# Patient Record
Sex: MALE | Race: BLACK OR AFRICAN AMERICAN | Employment: STUDENT | URBAN - METROPOLITAN AREA
[De-identification: names, ages, dates, MRNs, and addresses within clinical notes are randomized per-mention and may not be internally consistent; named-entity substitution may affect disease eponyms.]

---

## 2022-05-17 NOTE — H&P (VIEW-ONLY)
Name: Rigoberto Owen  YOB: 2000  Gender: male  MRN: [id number]      CC: Pre-op Exam (left hip)       HPI: Rigoberto Owen is a 25 y.o. male who returns for follow up on his left hip. He is here today for a preop exam prior to his scheduled procedure on 05/18/2022. He had an injury in April where he slipped while playing basketball and injured the left hip. He has failed conservative management and would like to proceed with surgery. He did have a second opinion with Dr. Gisela Wiley who reviewed the pathology with him as well and was in agreement with our assessment and plan. Current Outpatient Medications:     indomethacin SR (INDOCIN SR) 75 mg SR capsule, Take 1 Capsule by mouth daily for 3 days. (Patient not taking: Reported on 5/17/2022), Disp: 3 Capsule, Rfl: 0    meloxicam (MOBIC) 7.5 mg tablet, Take 1 Tablet by mouth daily. (Patient not taking: Reported on 5/17/2022), Disp: 30 Tablet, Rfl: 1    ondansetron (ZOFRAN ODT) 4 mg disintegrating tablet, Take 1 Tablet by mouth every six (6) hours as needed for Nausea or Vomiting. (Patient not taking: Reported on 5/17/2022), Disp: 20 Tablet, Rfl: 0    oxyCODONE IR (ROXICODONE) 5 mg immediate release tablet, Take 1-2 Tablets by mouth every four (4) hours as needed for Pain for up to 14 days. Max Daily Amount: 60 mg. (Patient not taking: Reported on 5/17/2022), Disp: 25 Tablet, Rfl: 0  No Known Allergies  No past medical history on file. No past surgical history on file. No family history on file.   Social History     Socioeconomic History    Marital status: SINGLE     Spouse name: Not on file    Number of children: Not on file    Years of education: Not on file    Highest education level: Not on file   Occupational History    Not on file   Tobacco Use    Smoking status: Never Smoker    Smokeless tobacco: Never Used   Vaping Use    Vaping Use: Never used   Substance and Sexual Activity    Alcohol use: Not Currently    Drug use: Never    Sexual activity: Not on file   Other Topics Concern    Not on file   Social History Narrative    Not on file     Social Determinants of Health     Financial Resource Strain:     Difficulty of Paying Living Expenses: Not on file   Food Insecurity:     Worried About Running Out of Food in the Last Year: Not on file    Emily of Food in the Last Year: Not on file   Transportation Needs:     Lack of Transportation (Medical): Not on file    Lack of Transportation (Non-Medical): Not on file   Physical Activity:     Days of Exercise per Week: Not on file    Minutes of Exercise per Session: Not on file   Stress:     Feeling of Stress : Not on file   Social Connections:     Frequency of Communication with Friends and Family: Not on file    Frequency of Social Gatherings with Friends and Family: Not on file    Attends Restorationism Services: Not on file    Active Member of [address] [place] [address] [employer] or Organizations: Not on file    Attends Club or Organization Meetings: Not on file    Marital Status: Not on file   Intimate Partner Violence:     Fear of Current or Ex-Partner: Not on file    Emotionally Abused: Not on file    Physically Abused: Not on file    Sexually Abused: Not on file   Housing Stability:     Unable to Pay for Housing in the Last Year: Not on file    Number of [place] in the Last Year: Not on file    Unstable Housing in the Last Year: Not on file           Physical Examination:  General: no acute distress  HEENT: Normocephalic/atraumatic  Lungs: breathing easily  CV: regular rhythm by pulse  Abdomen: Nontender  Left Hip: Obligatory external rotation of a few degrees flexion to 100 degrees internal rotation 5 to 10 degrees with positive FADIR impingement external rotation to about 40 with mild pain with SILVANA pain with Stinchfield good resisted abduction abduction strength. Assessment:     ICD-10-CM ICD-9-CM   1. Tear of left acetabular labrum, subsequent encounter  S73.192D V58.89     843.8   2. Left hip pain  M25.552 719.45   3. Femoral acetabular impingement  M25.059 719.85        Plan:   We discussed the details risks and benefits of hip arthroscopy to include cam and rim osteoplasty and labral repair versus debridement. We reviewed the risk of surgery including but not limited to anesthetic complications, infection, postoperative DVT/PE, postoperative stiffness, lateral femoral cutaneous nerve palsy, pudendal nerve palsy, heterotopic ossification as well as continued hip pain and possible need for further surgery in the future. We also reviewed the postoperative course including extensive physical therapy and a likely 5 to 6-month full recovery period. The patient expressed understanding and elected to proceed as planned, informed consent was obtained. Surgical plan to be for left hip arthroscopy cam and rim osteoplasty labral repair    Follow up         Elías Avilez MD, [address] [place] [address] Sports Medicine

## 2022-05-18 ENCOUNTER — APPOINTMENT (OUTPATIENT)
Dept: GENERAL RADIOLOGY | Age: 22
End: 2022-05-18
Attending: ORTHOPAEDIC SURGERY
Payer: COMMERCIAL

## 2022-05-18 ENCOUNTER — ANESTHESIA (OUTPATIENT)
Dept: SURGERY | Age: 22
End: 2022-05-18
Payer: COMMERCIAL

## 2022-05-18 ENCOUNTER — HOSPITAL ENCOUNTER (OUTPATIENT)
Age: 22
Setting detail: OUTPATIENT SURGERY
Discharge: HOME | End: 2022-05-18
Attending: ORTHOPAEDIC SURGERY | Admitting: ORTHOPAEDIC SURGERY
Payer: COMMERCIAL

## 2022-05-18 ENCOUNTER — ANESTHESIA EVENT (OUTPATIENT)
Dept: SURGERY | Age: 22
End: 2022-05-18
Payer: COMMERCIAL

## 2022-05-18 VITALS
DIASTOLIC BLOOD PRESSURE: 76 MMHG | SYSTOLIC BLOOD PRESSURE: 164 MMHG | BODY MASS INDEX: 25.19 KG/M2 | RESPIRATION RATE: 16 BRPM | WEIGHT: 218 LBS | HEART RATE: 68 BPM | TEMPERATURE: 98.3 F | OXYGEN SATURATION: 99 %

## 2022-05-18 LAB — HGB BLD-MCNC: 14.4 G/DL (ref 13.6–17.2)

## 2022-05-18 PROCEDURE — 77030019908 HC STETH ESOPH SIMS -A: Performed by: ANESTHESIOLOGY

## 2022-05-18 PROCEDURE — 77030008009 HC PRB ARTHO ABLT ARTH -C: Performed by: ORTHOPAEDIC SURGERY

## 2022-05-18 PROCEDURE — 74011000250 HC RX REV CODE- 250: Performed by: NURSE ANESTHETIST, CERTIFIED REGISTERED

## 2022-05-18 PROCEDURE — 74011250636 HC RX REV CODE- 250/636: Performed by: NURSE ANESTHETIST, CERTIFIED REGISTERED

## 2022-05-18 PROCEDURE — 77030002933 HC SUT MCRYL J&J -A: Performed by: ORTHOPAEDIC SURGERY

## 2022-05-18 PROCEDURE — C1713 ANCHOR/SCREW BN/BN,TIS/BN: HCPCS | Performed by: ORTHOPAEDIC SURGERY

## 2022-05-18 PROCEDURE — 2709999900 HC NON-CHARGEABLE SUPPLY: Performed by: ORTHOPAEDIC SURGERY

## 2022-05-18 PROCEDURE — 77030008494 HC TBNG ARTHSC IRR ARTH -B: Performed by: ORTHOPAEDIC SURGERY

## 2022-05-18 PROCEDURE — 77030040922 HC BLNKT HYPOTHRM STRY -A: Performed by: ANESTHESIOLOGY

## 2022-05-18 PROCEDURE — 29916 HIP ARTHRO W/LABRAL REPAIR: CPT | Performed by: ORTHOPAEDIC SURGERY

## 2022-05-18 PROCEDURE — 76060000036 HC ANESTHESIA 2.5 TO 3 HR: Performed by: ORTHOPAEDIC SURGERY

## 2022-05-18 PROCEDURE — 74011250636 HC RX REV CODE- 250/636: Performed by: ANESTHESIOLOGY

## 2022-05-18 PROCEDURE — 29914 HIP ARTHRO W/FEMOROPLASTY: CPT | Performed by: ORTHOPAEDIC SURGERY

## 2022-05-18 PROCEDURE — 77030002922 HC SUT FBRWRE ARTH -B: Performed by: ORTHOPAEDIC SURGERY

## 2022-05-18 PROCEDURE — 76010000172 HC OR TIME 2.5 TO 3 HR INTENSV-TIER 1: Performed by: ORTHOPAEDIC SURGERY

## 2022-05-18 PROCEDURE — 77030039260 HC BLD CAPSULECT ARTH -C: Performed by: ORTHOPAEDIC SURGERY

## 2022-05-18 PROCEDURE — 85018 HEMOGLOBIN: CPT

## 2022-05-18 PROCEDURE — 77030039425 HC BLD LARYNG TRULITE DISP TELE -A: Performed by: ANESTHESIOLOGY

## 2022-05-18 PROCEDURE — 77030034881 HC SUT MNGR SLNGSHOT DISP INST STRY -D: Performed by: ORTHOPAEDIC SURGERY

## 2022-05-18 PROCEDURE — 77030032673: Performed by: ORTHOPAEDIC SURGERY

## 2022-05-18 PROCEDURE — 74011250637 HC RX REV CODE- 250/637: Performed by: ANESTHESIOLOGY

## 2022-05-18 PROCEDURE — 77030020274 HC MISC IMPL ORTHOPEDIC: Performed by: ORTHOPAEDIC SURGERY

## 2022-05-18 PROCEDURE — 77030034880: Performed by: ORTHOPAEDIC SURGERY

## 2022-05-18 PROCEDURE — 74011250636 HC RX REV CODE- 250/636: Performed by: ORTHOPAEDIC SURGERY

## 2022-05-18 PROCEDURE — 77030037088 HC TUBE ENDOTRACH ORAL NSL COVD-A: Performed by: ANESTHESIOLOGY

## 2022-05-18 PROCEDURE — 76210000020 HC REC RM PH II FIRST 0.5 HR: Performed by: ORTHOPAEDIC SURGERY

## 2022-05-18 PROCEDURE — 74011250636 HC RX REV CODE- 250/636: Performed by: SPECIALIST/TECHNOLOGIST

## 2022-05-18 PROCEDURE — 77030015993 HC INSRT FT PD S&N -B: Performed by: ORTHOPAEDIC SURGERY

## 2022-05-18 PROCEDURE — 77030018673: Performed by: ORTHOPAEDIC SURGERY

## 2022-05-18 PROCEDURE — 77030036647 HC PORTAL ENTRY ENDOSC KT DISP STRY -D: Performed by: ORTHOPAEDIC SURGERY

## 2022-05-18 PROCEDURE — 77030019605: Performed by: ORTHOPAEDIC SURGERY

## 2022-05-18 PROCEDURE — 77030042187 HC SUT PASS -C: Performed by: ORTHOPAEDIC SURGERY

## 2022-05-18 PROCEDURE — 76210000006 HC OR PH I REC 0.5 TO 1 HR: Performed by: ORTHOPAEDIC SURGERY

## 2022-05-18 RX ORDER — NEOSTIGMINE METHYLSULFATE 1 MG/ML
INJECTION, SOLUTION INTRAVENOUS AS NEEDED
Status: DISCONTINUED | OUTPATIENT
Start: 2022-05-18 | End: 2022-05-18 | Stop reason: HOSPADM

## 2022-05-18 RX ORDER — PROPOFOL 10 MG/ML
INJECTION, EMULSION INTRAVENOUS AS NEEDED
Status: DISCONTINUED | OUTPATIENT
Start: 2022-05-18 | End: 2022-05-18 | Stop reason: HOSPADM

## 2022-05-18 RX ORDER — FENTANYL CITRATE 50 UG/ML
INJECTION, SOLUTION INTRAMUSCULAR; INTRAVENOUS AS NEEDED
Status: DISCONTINUED | OUTPATIENT
Start: 2022-05-18 | End: 2022-05-18 | Stop reason: HOSPADM

## 2022-05-18 RX ORDER — KETOROLAC TROMETHAMINE 30 MG/ML
INJECTION, SOLUTION INTRAMUSCULAR; INTRAVENOUS AS NEEDED
Status: DISCONTINUED | OUTPATIENT
Start: 2022-05-18 | End: 2022-05-18 | Stop reason: HOSPADM

## 2022-05-18 RX ORDER — SODIUM CHLORIDE 0.9 % (FLUSH) 0.9 %
5-40 SYRINGE (ML) INJECTION EVERY 8 HOURS
Status: DISCONTINUED | OUTPATIENT
Start: 2022-05-18 | End: 2022-05-18 | Stop reason: HOSPADM

## 2022-05-18 RX ORDER — MIDAZOLAM HYDROCHLORIDE 1 MG/ML
INJECTION, SOLUTION INTRAMUSCULAR; INTRAVENOUS AS NEEDED
Status: DISCONTINUED | OUTPATIENT
Start: 2022-05-18 | End: 2022-05-18 | Stop reason: HOSPADM

## 2022-05-18 RX ORDER — OXYCODONE HYDROCHLORIDE 5 MG/1
5 TABLET ORAL
Status: DISCONTINUED | OUTPATIENT
Start: 2022-05-18 | End: 2022-05-18 | Stop reason: HOSPADM

## 2022-05-18 RX ORDER — SODIUM CHLORIDE 9 MG/ML
50 INJECTION, SOLUTION INTRAVENOUS CONTINUOUS
Status: DISCONTINUED | OUTPATIENT
Start: 2022-05-18 | End: 2022-05-18 | Stop reason: HOSPADM

## 2022-05-18 RX ORDER — SODIUM CHLORIDE, SODIUM LACTATE, POTASSIUM CHLORIDE, CALCIUM CHLORIDE 600; 310; 30; 20 MG/100ML; MG/100ML; MG/100ML; MG/100ML
100 INJECTION, SOLUTION INTRAVENOUS CONTINUOUS
Status: DISCONTINUED | OUTPATIENT
Start: 2022-05-18 | End: 2022-05-18 | Stop reason: HOSPADM

## 2022-05-18 RX ORDER — FENTANYL CITRATE 50 UG/ML
25 INJECTION, SOLUTION INTRAMUSCULAR; INTRAVENOUS ONCE
Status: DISCONTINUED | OUTPATIENT
Start: 2022-05-18 | End: 2022-05-18 | Stop reason: HOSPADM

## 2022-05-18 RX ORDER — GLYCOPYRROLATE 0.2 MG/ML
INJECTION INTRAMUSCULAR; INTRAVENOUS AS NEEDED
Status: DISCONTINUED | OUTPATIENT
Start: 2022-05-18 | End: 2022-05-18 | Stop reason: HOSPADM

## 2022-05-18 RX ORDER — MIDAZOLAM HYDROCHLORIDE 1 MG/ML
2 INJECTION, SOLUTION INTRAMUSCULAR; INTRAVENOUS ONCE
Status: DISCONTINUED | OUTPATIENT
Start: 2022-05-18 | End: 2022-05-18 | Stop reason: HOSPADM

## 2022-05-18 RX ORDER — TRANEXAMIC ACID 100 MG/ML
INJECTION, SOLUTION INTRAVENOUS AS NEEDED
Status: DISCONTINUED | OUTPATIENT
Start: 2022-05-18 | End: 2022-05-18 | Stop reason: HOSPADM

## 2022-05-18 RX ORDER — CEFAZOLIN SODIUM/WATER 2 G/20 ML
2 SYRINGE (ML) INTRAVENOUS ONCE
Status: COMPLETED | OUTPATIENT
Start: 2022-05-18 | End: 2022-05-18

## 2022-05-18 RX ORDER — DIPHENHYDRAMINE HYDROCHLORIDE 50 MG/ML
12.5 INJECTION, SOLUTION INTRAMUSCULAR; INTRAVENOUS
Status: DISCONTINUED | OUTPATIENT
Start: 2022-05-18 | End: 2022-05-18 | Stop reason: HOSPADM

## 2022-05-18 RX ORDER — SODIUM CHLORIDE, SODIUM LACTATE, POTASSIUM CHLORIDE, CALCIUM CHLORIDE 600; 310; 30; 20 MG/100ML; MG/100ML; MG/100ML; MG/100ML
75 INJECTION, SOLUTION INTRAVENOUS CONTINUOUS
Status: DISCONTINUED | OUTPATIENT
Start: 2022-05-18 | End: 2022-05-18 | Stop reason: HOSPADM

## 2022-05-18 RX ORDER — SODIUM CHLORIDE 0.9 % (FLUSH) 0.9 %
5-40 SYRINGE (ML) INJECTION AS NEEDED
Status: DISCONTINUED | OUTPATIENT
Start: 2022-05-18 | End: 2022-05-18 | Stop reason: HOSPADM

## 2022-05-18 RX ORDER — EPINEPHRINE 1 MG/ML
INJECTION, SOLUTION, CONCENTRATE INTRAVENOUS AS NEEDED
Status: DISCONTINUED | OUTPATIENT
Start: 2022-05-18 | End: 2022-05-18 | Stop reason: HOSPADM

## 2022-05-18 RX ORDER — LIDOCAINE HYDROCHLORIDE 10 MG/ML
0.1 INJECTION INFILTRATION; PERINEURAL AS NEEDED
Status: DISCONTINUED | OUTPATIENT
Start: 2022-05-18 | End: 2022-05-18 | Stop reason: HOSPADM

## 2022-05-18 RX ORDER — LIDOCAINE HYDROCHLORIDE 20 MG/ML
INJECTION, SOLUTION EPIDURAL; INFILTRATION; INTRACAUDAL; PERINEURAL AS NEEDED
Status: DISCONTINUED | OUTPATIENT
Start: 2022-05-18 | End: 2022-05-18 | Stop reason: HOSPADM

## 2022-05-18 RX ORDER — HYDROMORPHONE HYDROCHLORIDE 2 MG/ML
0.5 INJECTION, SOLUTION INTRAMUSCULAR; INTRAVENOUS; SUBCUTANEOUS
Status: DISCONTINUED | OUTPATIENT
Start: 2022-05-18 | End: 2022-05-18 | Stop reason: HOSPADM

## 2022-05-18 RX ORDER — ROCURONIUM BROMIDE 10 MG/ML
INJECTION, SOLUTION INTRAVENOUS AS NEEDED
Status: DISCONTINUED | OUTPATIENT
Start: 2022-05-18 | End: 2022-05-18 | Stop reason: HOSPADM

## 2022-05-18 RX ORDER — ROPIVACAINE HYDROCHLORIDE 5 MG/ML
INJECTION, SOLUTION EPIDURAL; INFILTRATION; PERINEURAL AS NEEDED
Status: DISCONTINUED | OUTPATIENT
Start: 2022-05-18 | End: 2022-05-18 | Stop reason: HOSPADM

## 2022-05-18 RX ORDER — OXYCODONE AND ACETAMINOPHEN 5; 325 MG/1; MG/1
1 TABLET ORAL AS NEEDED
Status: DISCONTINUED | OUTPATIENT
Start: 2022-05-18 | End: 2022-05-18 | Stop reason: HOSPADM

## 2022-05-18 RX ORDER — KETAMINE HYDROCHLORIDE 50 MG/ML
INJECTION, SOLUTION INTRAMUSCULAR; INTRAVENOUS AS NEEDED
Status: DISCONTINUED | OUTPATIENT
Start: 2022-05-18 | End: 2022-05-18 | Stop reason: HOSPADM

## 2022-05-18 RX ORDER — FAMOTIDINE 20 MG/1
20 TABLET, FILM COATED ORAL ONCE
Status: COMPLETED | OUTPATIENT
Start: 2022-05-18 | End: 2022-05-18

## 2022-05-18 RX ORDER — MIDAZOLAM HYDROCHLORIDE 1 MG/ML
2 INJECTION, SOLUTION INTRAMUSCULAR; INTRAVENOUS
Status: DISCONTINUED | OUTPATIENT
Start: 2022-05-18 | End: 2022-05-18 | Stop reason: HOSPADM

## 2022-05-18 RX ORDER — DEXAMETHASONE SODIUM PHOSPHATE 4 MG/ML
INJECTION, SOLUTION INTRA-ARTICULAR; INTRALESIONAL; INTRAMUSCULAR; INTRAVENOUS; SOFT TISSUE AS NEEDED
Status: DISCONTINUED | OUTPATIENT
Start: 2022-05-18 | End: 2022-05-18 | Stop reason: HOSPADM

## 2022-05-18 RX ADMIN — KETAMINE HYDROCHLORIDE 20 MG: 50 INJECTION INTRAMUSCULAR; INTRAVENOUS at 08:27

## 2022-05-18 RX ADMIN — FENTANYL CITRATE 100 MCG: 50 INJECTION INTRAMUSCULAR; INTRAVENOUS at 08:27

## 2022-05-18 RX ADMIN — DEXAMETHASONE SODIUM PHOSPHATE 4 MG: 4 INJECTION, SOLUTION INTRAMUSCULAR; INTRAVENOUS at 08:37

## 2022-05-18 RX ADMIN — OXYCODONE HYDROCHLORIDE AND ACETAMINOPHEN 1 TABLET: 5; 325 TABLET ORAL at 11:39

## 2022-05-18 RX ADMIN — PHENYLEPHRINE HYDROCHLORIDE 100 MCG: 10 INJECTION INTRAVENOUS at 09:40

## 2022-05-18 RX ADMIN — Medication 2 G: at 08:39

## 2022-05-18 RX ADMIN — KETAMINE HYDROCHLORIDE 20 MG: 50 INJECTION INTRAMUSCULAR; INTRAVENOUS at 09:24

## 2022-05-18 RX ADMIN — SODIUM CHLORIDE, SODIUM LACTATE, POTASSIUM CHLORIDE, AND CALCIUM CHLORIDE: 600; 310; 30; 20 INJECTION, SOLUTION INTRAVENOUS at 09:25

## 2022-05-18 RX ADMIN — ROCURONIUM BROMIDE 50 MG: 50 INJECTION, SOLUTION INTRAVENOUS at 08:27

## 2022-05-18 RX ADMIN — ROCURONIUM BROMIDE 20 MG: 50 INJECTION, SOLUTION INTRAVENOUS at 08:59

## 2022-05-18 RX ADMIN — PROPOFOL 50 MG: 10 INJECTION, EMULSION INTRAVENOUS at 08:29

## 2022-05-18 RX ADMIN — PROPOFOL 350 MG: 10 INJECTION, EMULSION INTRAVENOUS at 08:27

## 2022-05-18 RX ADMIN — PHENYLEPHRINE HYDROCHLORIDE 100 MCG: 10 INJECTION INTRAVENOUS at 09:00

## 2022-05-18 RX ADMIN — HYDROMORPHONE HYDROCHLORIDE 0.5 MG: 2 INJECTION, SOLUTION INTRAMUSCULAR; INTRAVENOUS; SUBCUTANEOUS at 11:17

## 2022-05-18 RX ADMIN — Medication 3 MG: at 10:40

## 2022-05-18 RX ADMIN — TRANEXAMIC ACID 1 G: 100 INJECTION, SOLUTION INTRAVENOUS at 08:55

## 2022-05-18 RX ADMIN — FAMOTIDINE 20 MG: 20 TABLET ORAL at 07:18

## 2022-05-18 RX ADMIN — PHENYLEPHRINE HYDROCHLORIDE 100 MCG: 10 INJECTION INTRAVENOUS at 08:46

## 2022-05-18 RX ADMIN — MIDAZOLAM 2 MG: 1 INJECTION INTRAMUSCULAR; INTRAVENOUS at 08:23

## 2022-05-18 RX ADMIN — GLYCOPYRROLATE 0.4 MG: 0.2 INJECTION, SOLUTION INTRAMUSCULAR; INTRAVENOUS at 10:40

## 2022-05-18 RX ADMIN — LIDOCAINE HYDROCHLORIDE 100 MG: 20 INJECTION, SOLUTION EPIDURAL; INFILTRATION; INTRACAUDAL; PERINEURAL at 08:27

## 2022-05-18 RX ADMIN — HYDROMORPHONE HYDROCHLORIDE 0.5 MG: 2 INJECTION, SOLUTION INTRAMUSCULAR; INTRAVENOUS; SUBCUTANEOUS at 11:10

## 2022-05-18 RX ADMIN — SODIUM CHLORIDE, SODIUM LACTATE, POTASSIUM CHLORIDE, AND CALCIUM CHLORIDE 75 ML/HR: 600; 310; 30; 20 INJECTION, SOLUTION INTRAVENOUS at 07:19

## 2022-05-18 NOTE — PERIOP NOTES
Discharge instructions given and reviewed with patient, family and [facility] rep. Time allowed for questions and answers. Ice Man sent home with patient.

## 2022-05-18 NOTE — INTERVAL H&P NOTE
Update History & Physical    The Patient's History and Physical was reviewed   I discussed the surgery and patients medical condition with the patient. The chart was reviewed with the patient and I examined the patient. There was no change from previous note. The surgical site was confirmed by the patient and me. CV: RRR  RESP: CTAB    Plan:  The risk, benefits, expected outcome, and alternative to the recommended procedure have been discussed with the patient. Patient understands and elects to proceed with the procedure as planned.     Electronically signed by Jany Gautam MD on 05/18/22 7:02 AM

## 2022-05-18 NOTE — OP NOTES
Operative Report    Patient: Timur Philip MRN: [id number]  SSN: [id number]    YOB: 2000  Age: 25 y.o. Sex: male       Date of Surgery: 5/18/2022     Preoperative Diagnosis: Left hip pain [M25.552]  Femoral acetabular impingement [M25.859]  Tear of left acetabular labrum, initial encounter [S73.192A]     Postoperative Diagnosis: Left hip pain [M25.552]  Femoral acetabular impingement [M25.859]  Tear of left acetabular labrum, initial encounter [S73.192A]     Surgeon(s) and Role:     * Gabino Friedman MD - Primary    Anesthesia: General     Procedure: 1) left hip arthroscopy with labral repair  2) left hip arthroscopy with Cam femoroplasty  3) left hip arthroscopy with rim trimming acetabuloplasty       Estimated Blood Loss:  10 mL    TractionTime: 58 minutes    Implants:   Implant Name Type Inv. Item Serial No.  Lot No. LRB No. Used Action   SUTURE ANCHOR PEEK MINI HIP PUSH LOCK    [employer] [id number] Left 3 Implanted               Specimens: * No specimens in log *        Drains: None                Complications: None    Counts: Sponge and needle counts were correct times two. Indications: See H & P     Intraoperative Findings: Labral tearing from 12-2 o'clock  Small os acetabuli as well as some ossification of the subspine at the rectus attachment. Type III subspine  Large cam deformity    Procedure in Detail: After informed consent was obtained surgical site was marked in the preoperative area by myself. Patient brought the operating room placed supine the operating table and anesthesia was induced. Large well-padded post was assembled as well as well-padded traction boots on bilateral lower extremities were assembled. All bony prominences were well padded. Traction and countertraction were applied with the hip distraction device and fluoroscopy was taken to assure adequate distraction of the operative hip joint.   Left hip was then prepped and draped in usual orthopedic sterile fashion. Timeout was performed per protocol and antibiotics given per protocol. Initially a standard peritrochanteric proximal anterolateral ([place]) portal was established under fluoroscopic guidance. Arthroscopic regulation techniques were then used to establish a modified mid anterior portal (MAP) which was established atraumatically outside of the labrum. From that view there was synovitis and displaced labral tearing. Arthroscope was then placed in the mid anterior portal to evaluate the placement of the [place] portal which was noted to be in good position outside of the labrum. There was extensive synovitis and labral tearing from this view but maintenance of the articular surfaces. Arthroscopic knife was then used to create an interportal capsulotomy going posteriorly initially and then working more anteriorly towards our portal. Hemostasis was obtained with an RF device. We then switched the arthroscope back to the [place] portal and completed the interportal capsulotomy anteriorly using the arthroscopic knife. RF device was then used to obtain hemostasis. Diagnostic arthroscopy was then completed. Inspection of the labrum revealed displaced unstable labral tearing from 12-2 o'clock in the chondral labral junction as well as some intrasubstance tearing and some chondral labral delamination from 1-2 o'clock with a small chondral labral wave sign. .  RF device was used to expose the acetabular rim which revealed type III subspine with over coverage as well as some ossification with a free fragment that was dissected out with a combination of an RF device and then removed with a grasper. There also was a small os acetabuli that was more lateral between 12 and 1:00 it was dissected out with an RF device and removed with a shaver. There was still some over coverage of the acetabulum which was mild. .  Using a motorized bur the acetabular rim was resected up into the subspine region resecting the anterior inferior downsloping. The acetabular rim was freshened as well to prepare for labral repair. .  Motorized shaver was used to debride the free edge labral tearing. We then placed a cannula in the mid anterior portal and use a suture passing device to pass a suture tape in a luggage tag wraparound configuration at about 2:00 and this was placed into a knotless push lock anchor. We then repeated this coming more laterally placing an anchor at about the 1 o'clock position. We then switched portals and work from the lateral portal viewed from anteriorly and placed a third anchor at about the 12 o'clock position and a luggage tag wraparound suture tape configuration placed into a knotless push lock. The edges were debrided using motorized shaver. The labrum was probed and noted to be stable. Traction was let off at this point 58 minutes and there was good restoration of the suction seal of the joint. Arthroscope was placed in the mid anterior portal and the hip was flexed up. The retinacular vessels were visualized and noted to be intact. There was a large cam deformity at the head neck junction that extended posterior and appeared to impinge upon dynamic exam.  We used an RF device to expose this. After exposure of the cam deformity motorized bur was used to establish head neck junction and then create a femoroplasty resection creating offset of the head neck junction and contouring this going distally resecting approximately 5 mm of bone. We was used to guide our resection. Dynamic exam was confirmed which revealed no further impingement. Multiple fluoroscopic views were taken which showed adequate restoration of offset of the head neck junction. The hip was then placed in 20 to 30 degrees of flexion and using a suture passing device and a single portal technique three #2 sutures were placed in the intraportal capsulotomy for capsular closure.   Local injection cocktail of ropivacaine,epinephrine  and Toradol dilute with saline was injected with a small amount intra-articularly and periarticular the around the hip joint capsule for postoperative pain control. Skin was closed with buried Monocryl suture and Steri-Strips and a sterile dressing and cryotherapy device was applied.   Arthur tolerated the procedure well was awakened and transferred to the PACU in stable condition    Postoperative Plan  1) Discharge home  2) HO prophylaxis per protocol with Indocin and Mobic  3) Hip arthroscopy labral repair protocol    Signed By:  Scotty Castro MD     May 18, 2022

## 2022-05-18 NOTE — DISCHARGE INSTRUCTIONS
POST-OP INSTRUCTIONS FOR HIP ARTHROSCOPY   (Dr. Oliver Clemente)    Day of Surgery:  DIET: Begin with liquids and light foods (jello, soup, etc). Progress to your normal diet if you are not nauseated. MEDICATION:                                                          1.Pain medication: Norco (hydrocodone/acetaminophen) or Oxycodone. This is strong pain medication, use caution with walking, climbing stairs. Do not combine pain medication with alcohol. If you are taking oxycodone, you may also take Tylenol (acetaminophen) 500mg tabs. 2 tabs every 8 hours. DO NOT take Tylenol (acetaminophen) if you are given Norco as this medicine already contains acetaminophen             -Slowly wean off the pain medication as the pain improves. 2.Stool Softener. Pain medication can cause constipation. Be sure to drink plenty of water and take an over the counter stool softener as needed. 3. Heterotopic Bone Prophylaxis: Indomethacin 1 tab x 3 days. You will then begin taking Mobic 1 tab daily for one month. These medications can upset your stomach. Take them with food. If you experience GI discomfort or heartburn, add Prilosec OTC once daily for the month, and call the office. ICE: Do NOT put ice machine directly on your skin. Use it frequently for the first 72 hours. You may also use a regular ice bag/pack, 20 minutes at a time. You may use the ice machine continuously if your skin is not irritated or burning from it    SHOWERING: No showering until post op day 3. Leave bandages in place. ACTIVITY: Use crutches with partial weightbearing, flat foot on the operative leg. This should only be the weight of your leg. DO NOT walk on your toes. Begin ankle pumps. First & Second Post-OP Day:  Meds: continue as instructed above. Bandages: No showering, keep bandage in place.  You may experience liquid drainage from the hip onto the bandages which is normal. If they become     EXERCISES: On Day 1 if pain is well controlled or Day 2 :   Begin passive circumduction exercises. 5 minutes clockwise, and 5 minutes counter-clockwise without the hip flexed (lying flat). 5 minutes clockwise and 5 min counter-clockwise with the hip flexed approximately 70 deg and the knee flexed 90 deg. (20 minutes total combined). The motion should be done by a family member or care giver only, do not try to move the hip yourself. YOU DO NOT NEED TO DO THIS IF YOU ARE USING THE CPM MACHINE. CPM machine (continuous passive motion): If you have this machine for postoperative use. You may begin using this on postoperative day 1 or 2 if pain is controlled. Begin range of motion from 30 to 70 degrees of flexion. Goal of 4 hours a day in the motion machine that can be split up in multiple sessions. You may advance 10 degrees at a time in each direction as your comfort and pain allows and your motion improves. Continue this daily until your follow up appointment. Icing: Continue to use the ice machine frequently. Third Post-OP Day:  MEDICATION: continue as instructed above  BANDAGES (after 72 hours): remove outer bandages, leave steri-strips in place. You may shower, but keep the incisions as dry as possible (cover with plastic wrap). It is best to sit down in the shower to avoid slipping. Do not flex the hip past 90 degrees while in the shower. EXERCISES: continue as above. ICE: continue to ice frequently either with the ice machine or regular ice pack. Do not put it directly on your skin, place a thin layer of clothing or towel. Ice for 20-30 minutes on, then 20-30 minutes off. Physical Therapy  You should have a PT visit scheduled within 3-5 days after your surgery to begin motion exercises and instruct you on exercises at home. If you have not been contacted by day 2 after surgery then call the office to request therapy being scheduled. Hip Response to Surgery:  Your thigh may be swollen. It may take a week or longer for this to resolve. It is common to notice bruising around the hip and thigh. Wearing compression shorts or tights may help decrease this in the initial period. You will see Dr Kary Juan or his  assistant 10-15 days after surgery. Problems: Please call our office at [phone] if you have any questions or problems. Please contact us immediately if you notice fever greater than 101 F, excessive bleeding or drainage from the surgery site, calf pain, or shortness of breath. If you are calling after hours or on a weekend you may receive a call back from the on call physician or PA. MEDICATION INTERACTION:  During your procedure you potentially received a medication or medications which may reduce the effectiveness of oral contraceptives. Please consider other forms of contraception for 1 month following your procedure if you are currently using oral contraceptives as your primary form of birth control. In addition to this, we recommend continuing your oral contraceptive as prescribed, unless otherwise instructed by your physician, during this time    After general anesthesia or intravenous sedation, for 24 hours or while taking prescription Narcotics:  · Limit your activities  · A responsible adult needs to be with you for the next 24 hours  · Do not drive and operate hazardous machinery  · Do not make important personal or business decisions  · Do not drink alcoholic beverages  · If you have not urinated within 8 hours after discharge, and you are experiencing discomfort from urinary retention, please go to the nearest ED. · If you have sleep apnea and have a CPAP machine, please use it for all naps and sleeping. · Please use caution when taking narcotics and any of your home medications that may cause drowsiness. *  Please give a list of your current medications to your Primary Care Provider.   *  Please update this list whenever your medications are discontinued, doses are      changed, or new medications (including over-the-counter products) are added. *  Please carry medication information at all times in case of emergency situations. These are general instructions for a healthy lifestyle:  No smoking/ No tobacco products/ Avoid exposure to second hand smoke  Surgeon General's Warning:  Quitting smoking now greatly reduces serious risk to your health. Obesity, smoking, and sedentary lifestyle greatly increases your risk for illness  A healthy diet, regular physical exercise & weight monitoring are important for maintaining a healthy lifestyle    You may be retaining fluid if you have a history of heart failure or if you experience any of the following symptoms:  Weight gain of 3 pounds or more overnight or 5 pounds in a week, increased swelling in our hands or feet or shortness of breath while lying flat in bed. Please call your doctor as soon as you notice any of these symptoms; do not wait until your next office visit.

## 2022-05-18 NOTE — ANESTHESIA POSTPROCEDURE EVALUATION
Procedure(s):  LEFT HIP ARTHROSCOPY FEMOROPLASTY, ACETABULOPLASTY AND LABRAL REPAIR. general    Anesthesia Post Evaluation      Multimodal analgesia: multimodal analgesia used between 6 hours prior to anesthesia start to PACU discharge  Patient location during evaluation: bedside  Patient participation: complete - patient participated  Level of consciousness: awake and awake and alert  Pain management: adequate  Airway patency: patent  Anesthetic complications: no  Cardiovascular status: acceptable and stable  Respiratory status: acceptable and room air  Hydration status: acceptable  Post anesthesia nausea and vomiting:  none  Final Post Anesthesia Temperature Assessment:  Normothermia (36.0-37.5 degrees C)      INITIAL Post-op Vital signs:   Vitals Value Taken Time   /75 05/18/22 1153   Temp 36.8 °C (98.3 °F) 05/18/22 1108   Pulse 66 05/18/22 1153   Resp 16 05/18/22 1149   SpO2 100 % 05/18/22 1153   Vitals shown include unvalidated device data.

## 2022-05-19 ENCOUNTER — HOSPITAL ENCOUNTER (OUTPATIENT)
Dept: PHYSICAL THERAPY | Age: 22
Discharge: HOME | End: 2022-05-19
Payer: COMMERCIAL

## 2022-05-19 DIAGNOSIS — M25.859 FEMORAL ACETABULAR IMPINGEMENT: ICD-10-CM

## 2022-05-19 DIAGNOSIS — S73.192A TEAR OF LEFT ACETABULAR LABRUM, INITIAL ENCOUNTER: ICD-10-CM

## 2022-05-19 DIAGNOSIS — M25.552 LEFT HIP PAIN: ICD-10-CM

## 2022-05-19 PROCEDURE — 97110 THERAPEUTIC EXERCISES: CPT

## 2022-05-19 PROCEDURE — 97161 PT EVAL LOW COMPLEX 20 MIN: CPT

## 2022-05-19 NOTE — PROGRESS NOTES
Jazzy Gordon  : 2000  Primary: Olga Christian   Secondary: [employer] [facility] @ [address], [place]  [phone]   [phone]      OUTPATIENT PHYSICAL THERAPY: Daily Treatment Note  2022         _________________________________________________________________________  Pre-treatment Symptoms/Complaints:  See initial evaluation  Pain: Initial: 0/10 Post Session:  0/10   Medications Last Reviewed:  2022  Updated Objective Findings:  Below measures from initial evaluation unless otherwise noted. TREATMENT:   THERAPEUTIC ACTIVITY: ( see below for minutes): Therapeutic activities per grid below to improve mobility, strength, balance and coordination. Required minimal visual, verbal, manual and tactile cues to improve independence and safety with daily activities . THERAPEUTIC EXERCISE: (see below for minutes):  Exercises per grid below to improve mobility, strength, balance and coordination. Required minimal verbal and manual cues to promote proper body alignment, promote proper body posture and promote proper body mechanics. Progressed resistance, range, repetitions and complexity of movement as indicated. MANUAL THERAPY: (see below for minutes): Joint mobilization and Soft tissue mobilization was utilized and necessary because of the patient's restricted joint motion, painful spasm, loss of articular motion and restricted motion of soft tissue. MODALITIES: (see below for minutes):      to decrease pain    SELF CARE: (see below for minutes): Procedure(s) (per grid) utilized to improve and/or restore self-care/home management as related to dressing, bathing and grooming. Required minimal verbal cueing to facilitate activities of daily living skills and compensatory activities.      Date: 22       Modalities:                                Therapeutic Exercise: 25 min        glute set [id number]       Quad set [id number]       Isometric trunk activation [id number]       Gait training Crutches adjusted and instructed on swing through pattern                       Proprioceptive Activities:                                Manual Therapy:                        Therapeutic Activities:                                  HEP: see 5/19/22 flow sheet for specifics. [employer] [facility]  Treatment/Session Summary:    · Response to Treatment:  Patient is independent with performance of above described home program.  · Communication/Consultation:  None today  · Equipment provided today:  None today  · Recommendations/Intent for next treatment session: Next visit will focus on progression of strength and return to prior level of function.     Total Treatment Billable Duration:  45 minutes  PT Patient Time In/Time Out  Time In: 1015  Time Out: [address], DPT    Future Appointments   Date Time Provider Ernie Lambert   5/20/2022  9:30 AM Dangelo Salas, DPT [facility]

## 2022-05-19 NOTE — THERAPY EVALUATION
Wendy Gandara  : 2000 [facility] [address]  [address] [address].  [phone]   [phone]        OUTPATIENT PHYSICAL THERAPY:Initial Assessment 2022         ICD-10: Treatment Diagnosis: Pain in left hip (M25.552) and Stiffness of left hip, not elsewhere classified (M25.652) and Difficulty in walking, not elsewhere classified (R26.2)  Precautions/Allergies:   Patient has no known allergies. Fall Risk Score:     Ambulatory/Rehab Services H2 Model Falls Risk Assessment    Risk Factors:       (1)  Gender [Male] Ability to Rise from Chair:       (0)  Ability to rise in a single movement    Falls Prevention Plan:       No modifications necessary   Total: (5 or greater = High Risk): 1     [facility]. All Rights Reserved. [facility] R&L Patent #[id number]. 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 Darline Hanson presents to therapy following above surgical repair. He has impairments in mobility of the limb due to continued healing of the repair, increased swelling through the hip joint, and tightness of the hip musculature. He has decreased strength through the limb due to inhibition from swelling and pain, and continued precautions for surgical healing. He has impaired mobility due to weight bearing restrictions and decreased dynamic stability of the limb. Skilled therapy is required to return to prior level of function.      PROBLEM LIST (Impacting functional limitations):  1. Decreased Strength  2. Decreased ADL/Functional Activities  3. Decreased Transfer Abilities  4. Decreased Ambulation Ability/Technique  5. Decreased Balance  6. Increased Pain  7. Decreased Activity Tolerance  8. Decreased Flexibility/Joint Mobility INTERVENTIONS PLANNED:  1. Balance Exercise  2. Family Education  3. Gait Training  4. Home Exercise Program (HEP)  5. Manual Therapy  6. Neuromuscular Re-education/Strengthening  7. Range of Motion (ROM)  8. Therapeutic Activites  9. Therapeutic Exercise/Strengthening  10. modalities    TREATMENT PLAN:  Effective Dates: 5/19/2022 TO 8/18/2022 (90 days). Frequency/Duration: 3 times a week for 90 Days  GOALS: (Goals have been discussed and agreed upon with patient.)  Short-Term Functional Goals: Time Frame: 6 weeks  1. Patient will be independent with HEP. 2. Patient will demonstrate sufficient healing to progress to WBAT. 3. Patient will demonstrate symmetric step length with gait to improve stability during community mobility. Discharge Goals: Time Frame: 16 weeks  1. Patient will demonstrate symmetric single leg hop and stick to restore dynamic stability for return to prior level of function. 2. Patient will demonstrate >90% symmetric on isokinetic knee extension and flexion testing to normalize strength for return to prior level of function. 3. Patient will demonstrate 5/5 strength with single leg squat to restore limb strength for return to prior level of function. Rehabilitation Potential For Stated Goals: Excellent  Regarding [address] [place] [address]'s therapy, I certify that the treatment plan above will be carried out by a therapist or under their direction. Thank you for this referral,  Mickie Elise DPT       Referring Physician Signature: Laurie Valderrama MD              Date                      HISTORY:   History of Present Injury/Illness (Reason for Referral):  Patient presents to therapy following above surgical repair. Initial injury occurred approximately 4 weeks ago when he planted on the right leg and had the left leg forcefully extend behind him. He was playing basketball as part of practice when this happened. He initially rested, which helped to ease his symptoms, but when he started to return to activity he continued to have pain and limitations. He notes no complications with surgery today and presents with primary complaint of general soreness through the hip and thigh. Past Medical History/Comorbidities:   Mr. Darline Hanson  has no past medical history on file. Mr. Darline Hanson  has no past surgical history on file. Social History/Living Environment:     Patient is a college student and lives on campus. Prior Level of Function/Work/Activity:  Patient plays collegiate basketball. Dominant Side:         RIGHT  Current Medications:    Current Outpatient Medications:     indomethacin SR (INDOCIN SR) 75 mg SR capsule, Take 1 Capsule by mouth daily for 3 days. (Patient not taking: Reported on 5/17/2022), Disp: 3 Capsule, Rfl: 0    meloxicam (MOBIC) 7.5 mg tablet, Take 1 Tablet by mouth daily. (Patient not taking: Reported on 5/17/2022), Disp: 30 Tablet, Rfl: 1    ondansetron (ZOFRAN ODT) 4 mg disintegrating tablet, Take 1 Tablet by mouth every six (6) hours as needed for Nausea or Vomiting. (Patient not taking: Reported on 5/17/2022), Disp: 20 Tablet, Rfl: 0    oxyCODONE IR (ROXICODONE) 5 mg immediate release tablet, Take 1-2 Tablets by mouth every four (4) hours as needed for Pain for up to 14 days. Max Daily Amount: 60 mg. (Patient not taking: Reported on 5/17/2022), Disp: 25 Tablet, Rfl: 0  No current facility-administered medications for this encounter. Date Last Reviewed:  5/19/2022   # of Personal Factors/Comorbidities that affect the Plan of Care: 0: LOW COMPLEXITY   EXAMINATION:   Observation/Orthostatic Postural Assessment:    Incision is covered with surgical dressings.  These are left in place for today and will be removed Monday. Dressings are dry. He ambulates with toe touch WB with B axillary crutches. Palpation:    Mild tenderness through the lateral thigh. ROM:    Hip flexion: 90 ° (limit ROM per protocol)  Hip ER: 20 ° (limit ROM per protocol)  Ankle and knee mobility are WNL  Strength:   glute set is good  Quad set is fair  Further strength testing is not currently indicated. Special Tests:    Not applicable  Neurological Screen:  Light touch is diminished to anterior and lateral thigh. Fully intact distal to the knee. Functional Mobility:   See observation above  Balance:    Not currently indicated   Body Structures Involved:  1. Nerves  2. Bones  3. Joints  4. Muscles  5. Ligaments Body Functions Affected:  1. Sensory/Pain  2. Neuromusculoskeletal  3. Movement Related Activities and Participation Affected:  1. General Tasks and Demands  2. Mobility  3. Self Care  4. Domestic Life  5. Interpersonal Interactions and Relationships  6. Community, Social and [place]   # of elements that affect the Plan of Care: 1-2: LOW COMPLEXITY   CLINICAL PRESENTATION:   Presentation: Stable and uncomplicated: LOW COMPLEXITY   CLINICAL DECISION MAKING:   Tool Used: Lower Extremity Functional Scale (LEFS)  Score:  Initial: 17/80 Most Recent: X/80 (Date: -- )   Interpretation of Score: 20 questions each scored on a 5 point scale with 0 representing \"extreme difficulty or unable to perform\" and 4 representing \"no difficulty\". The lower the score, the greater the functional disability. 80/80 represents no disability. Minimal detectable change is 9 points. Medical Necessity:   · Patient is expected to demonstrate progress in strength, range of motion, balance and coordination  ·  to increase independence with community mobility and return to prior level of function  · .  Reason for Services/Other Comments:  · Patient has demonstrated an improvement in functional level by independent performance of HEP.    · .   Use of outcome tool(s) and clinical judgement create a POC that gives a: Clear prediction of patient's progress: LOW COMPLEXITY     Total Treatment Duration:  PT Patient Time In/Time Out  Time In: 1015  Time Out: [facility]

## 2022-05-20 ENCOUNTER — HOSPITAL ENCOUNTER (OUTPATIENT)
Dept: PHYSICAL THERAPY | Age: 22
Discharge: HOME | End: 2022-05-20
Payer: COMMERCIAL

## 2022-05-20 PROCEDURE — 97110 THERAPEUTIC EXERCISES: CPT

## 2022-05-20 NOTE — PROGRESS NOTES
Moraima Schultz  : 2000  Primary: [employer]  Secondary: [employer] Jess Apley @ [address], [address] [place] [address]  [phone]   [phone]      OUTPATIENT PHYSICAL THERAPY: Daily Treatment Note  2022         _________________________________________________________________________  Pre-treatment Symptoms/Complaints:  Difficulty with sleeping last night due to hard to get comfortable. Notes minimal pain through the hip. Pain: Initial: 2/10 Post Session:  0/10   Medications Last Reviewed:  2022  Updated Objective Findings:  Below measures from initial evaluation unless otherwise noted. TREATMENT:   THERAPEUTIC ACTIVITY: ( see below for minutes): Therapeutic activities per grid below to improve mobility, strength, balance and coordination. Required minimal visual, verbal, manual and tactile cues to improve independence and safety with daily activities . THERAPEUTIC EXERCISE: (see below for minutes):  Exercises per grid below to improve mobility, strength, balance and coordination. Required minimal verbal and manual cues to promote proper body alignment, promote proper body posture and promote proper body mechanics. Progressed resistance, range, repetitions and complexity of movement as indicated. MANUAL THERAPY: (see below for minutes): Joint mobilization and Soft tissue mobilization was utilized and necessary because of the patient's restricted joint motion, painful spasm, loss of articular motion and restricted motion of soft tissue. MODALITIES: (see below for minutes):      to decrease pain    SELF CARE: (see below for minutes): Procedure(s) (per grid) utilized to improve and/or restore self-care/home management as related to dressing, bathing and grooming. Required minimal verbal cueing to facilitate activities of daily living skills and compensatory activities.      Date: 22 (visit 2)      Modalities:  10 min         Ice to anterior hip x 10 min                       Therapeutic Exercise: 25 min  40 min      glute set [id number]      Quad set [id number]      Isometric trunk activation [id number]      Gait training Crutches adjusted and instructed on swing through pattern Stair training 5 min       Stretching  Log rolling to hip IR; PROM to 90 ° hip flexion       Hip isometrics  [id number] into adduction, abduction, and extension each      Pelvic tilts  4x10                      Proprioceptive Activities:                                Manual Therapy:                        Therapeutic Activities:                                  HEP: see 5/19/22 flow sheet for specifics. [employer] Portal  Treatment/Session Summary:    · Response to Treatment:  Safe with stair mobility. Progressed with isometric training to decrease swelling and limit muscular atrophy. · Communication/Consultation:  None today  · Equipment provided today:  None today  · Recommendations/Intent for next treatment session: Next visit will focus on progression of strength and return to prior level of function. Total Treatment Billable Duration:  50 minutes  PT Patient Time In/Time Out  Time In: 0930  Time Out: 451 Sheri Philip DPT    No future appointments.

## 2022-05-23 ENCOUNTER — HOSPITAL ENCOUNTER (OUTPATIENT)
Dept: PHYSICAL THERAPY | Age: 22
Setting detail: RECURRING SERIES
Discharge: HOME | End: 2022-05-26
Payer: COMMERCIAL

## 2022-05-23 PROCEDURE — 97110 THERAPEUTIC EXERCISES: CPT

## 2022-05-23 NOTE — PROGRESS NOTES
Chuck Gomez  : 2000  Primary: [address]  Secondary: [phone] Kranthi Doss @ [address]  [place] [address]  Phone: [phone]  Fax: [phone] No data recorded  No data recorded    PT Visit Info: Total # of Visits to Date: 3        L hip labral repair DOS 22   OUTPATIENT PHYSICAL BLLOCQK:OO NOTE TYPE: Treatment Note 2022     Appt Desk   Episode      Treatment Diagnosis:  Pain in left hip (M25.552)  Stiffness of Left Hip, Not elsewhere classified (I75.759)  Medical/Referring Diagnosis:  Tear of left acetabular labrum, initial encounter [J24.211P]  Referring Physician:  Zhane Interiano MD MD Orders:  PT Eval and Treat   Date of Onset:  No data recorded  Date of surgery: 22  Allergies:  Patient has no allergy information on record. see initial evaluation   Restrictions/Precautions:    No data recordedNo data recorded  per physician protocol included in paper chart  Interventions Planned (Treatment may consist of any combination of the following):    No data recorded  see initial evaluation   Subjective Comments:   notes it feels good to get it moving. Continues to use CPM.   Initial:  2    /10 Post Session:  1    /10  Medications Last Reviewed:  2022  Updated Objective Findings:  None Today  Treatment   TREATMENT:   THERAPEUTIC ACTIVITY: ( see below for minutes): Therapeutic activities per grid below to improve mobility, strength, balance and coordination. Required minimal visual, verbal, manual and tactile cues to improve independence and safety with daily activities . THERAPEUTIC EXERCISE: (see below for minutes): Exercises per grid below to improve mobility, strength, balance and coordination. Required minimal verbal and manual cues to promote proper body alignment, promote proper body posture and promote proper body mechanics.  Progressed resistance, range, repetitions and complexity of movement as indicated. MANUAL THERAPY: (see below for minutes): Joint mobilization and Soft tissue mobilization was utilized and necessary because of the patient's restricted joint motion, painful spasm, loss of articular motion and restricted motion of soft tissue. MODALITIES: (see below for minutes): to decrease pain   SELF CARE: (see below for minutes): Procedure(s) (per grid) utilized to improve and/or restore self-care/home management as related to dressing, bathing and grooming. Required minimal verbal cueing to facilitate activities of daily living skills and compensatory activities      Date: 5/23/22 (visit 3)    Modalities: 10 min    Ice 10 min at end of session           Therapeutic Exercise: 45 min     Stretching: log roll into IR; passive ROM into flexion to 90 °     Isometrics: [id number]  Trunk activation   glute set  Quad set  Hip adduction  Hip abduction  Hip extension    Pelvic tilts 20x  Bridge 3x10 cueing glute activation  Bike 5 min                    Proprioceptive Activities:                Manual Therapy:            Functional Activities:                        Treatment/Session Summary:    · Treatment Assessment:    Surgical dressings removed. These were dry. Steri-strips intact. · Communication/Consultation:  None today  · Equipment provided today:  None  · Recommendations/Intent for next treatment session: Next visit will focus on progression of strength and return to prior level of function.     Total Treatment Billable Duration:  55 minutes  Time In: 0845  Time Out: [address], PT       Post Session Pain  Charge Capture  [employer] Portal  MD Guidelines  Scanned Media  Benefits  MyChart

## 2022-05-25 ENCOUNTER — HOSPITAL ENCOUNTER (OUTPATIENT)
Dept: PHYSICAL THERAPY | Age: 22
Setting detail: RECURRING SERIES
Discharge: HOME | End: 2022-05-28
Payer: COMMERCIAL

## 2022-05-25 PROCEDURE — 97110 THERAPEUTIC EXERCISES: CPT

## 2022-05-25 NOTE — PROGRESS NOTES
Ty Fermin  : 2000  Primary: [address]  Secondary: [phone] Kranthi Doss @ [address]  [facility] [address]  Phone: [phone]  Fax: [phone] No data recorded  No data recorded    PT Visit Info: Total # of Visits to Date: 4        L hip labral repair DOS 22   OUTPATIENT PHYSICAL UCHGQSB:EDILSON NOTE TYPE: Treatment Note 2022     Appt Desk   Episode      Treatment Diagnosis:  Pain in left hip (M25.552)  Stiffness of Left Hip, Not elsewhere classified (A20.496)  Medical/Referring Diagnosis:  Tear of left acetabular labrum, initial encounter [Z60.012R]  Referring Physician:  Sonal Monzon MD MD Orders:  PT Eval and Treat   Date of Onset:  No data recorded  Date of surgery: 22  Allergies:  Patient has no allergy information on record. see initial evaluation   Restrictions/Precautions:    No data recordedNo data recorded  per physician protocol included in paper chart  Interventions Planned (Treatment may consist of any combination of the following):    No data recorded  see initial evaluation   Subjective Comments:  notes the hip is continuing to improve. not having pain. had mild soreness following previous session, however this resolved quickly. Initial:  0    /10 Post Session:  0    /10  Medications Last Reviewed:  2022  Updated Objective Findings:  None Today  Treatment   TREATMENT:   THERAPEUTIC ACTIVITY: ( see below for minutes): Therapeutic activities per grid below to improve mobility, strength, balance and coordination. Required minimal visual, verbal, manual and tactile cues to improve independence and safety with daily activities . THERAPEUTIC EXERCISE: (see below for minutes): Exercises per grid below to improve mobility, strength, balance and coordination. Required minimal verbal and manual cues to promote proper body alignment, promote proper body posture and promote proper body mechanics.  Progressed resistance, range, repetitions and complexity of movement as indicated. MANUAL THERAPY: (see below for minutes): Joint mobilization and Soft tissue mobilization was utilized and necessary because of the patient's restricted joint motion, painful spasm, loss of articular motion and restricted motion of soft tissue. MODALITIES: (see below for minutes): to decrease pain   SELF CARE: (see below for minutes): Procedure(s) (per grid) utilized to improve and/or restore self-care/home management as related to dressing, bathing and grooming. Required minimal verbal cueing to facilitate activities of daily living skills and compensatory activities      Date: 5/23/22 (visit 3)  5/25/22 (visit 4)   Modalities: 10 min  10 min    Ice 10 min at end of session repeat             Therapeutic Exercise: 45 min  45 min     Stretching: log roll into IR; passive ROM into flexion to 90 °  Stretching: log roll into IR; PROM into hip flexion to 90 °   Isometrics: as before      Isometrics: [id number]  Trunk activation   glute set  Quad set  Hip adduction  Hip abduction  Hip extension    Pelvic tilts 20x  Bridge 3x10 cueing glute activation  Bike 5 min  Pelvic tilts 20x  Bridge 3x10 cueing glute activation   Bike 7 min   Jhon stretch [id number] with therapist assist                       Proprioceptive Activities:                    Manual Therapy:               Functional Activities:                             Treatment/Session Summary:    · Treatment Assessment:   symptoms are continuing to settle. continues to require weight bearing restrictions for further healing. has good force production with isometrics with good muscle activation. · Communication/Consultation:  None today  · Equipment provided today:  None  · Recommendations/Intent for next treatment session: Next visit will focus on progression of strength and return to prior level of function.     Total Treatment Billable Duration:   55 minutes  Time In: 0800  Time Out: 1818 [facility] Session Pain  Charge Capture  [employer] Portal  MD Guidelines  Scanned Media  Benefits  MyChart

## 2022-05-27 ENCOUNTER — HOSPITAL ENCOUNTER (OUTPATIENT)
Dept: PHYSICAL THERAPY | Age: 22
Setting detail: RECURRING SERIES
Discharge: HOME | End: 2022-05-30
Payer: COMMERCIAL

## 2022-05-27 PROCEDURE — 97110 THERAPEUTIC EXERCISES: CPT

## 2022-05-27 NOTE — PROGRESS NOTES
Redd Thomas  : 2000  Primary: [address]  Secondary: [phone] Kranthi Doss @ [address]  [facility] [address]  Phone: [phone]  Fax: [phone] No data recorded  No data recorded    PT Visit Info: Total # of Visits to Date: 4        L hip labral repair DOS 22   OUTPATIENT PHYSICAL VAN:SISSY NOTE TYPE: Treatment Note 2022     Appt Desk   Episode      Treatment Diagnosis:  Pain in left hip (M25.552)  Stiffness of Left Hip, Not elsewhere classified (I01.186)  Medical/Referring Diagnosis:  Tear of left acetabular labrum, initial encounter [L44.842S]  Referring Physician:  Mily Storm MD MD Orders:  PT Eval and Treat   Date of Onset:  No data recorded  Date of surgery: 22  Allergies:  Patient has no allergy information on record. see initial evaluation   Restrictions/Precautions:    No data recordedNo data recorded  per physician protocol included in paper chart  Interventions Planned (Treatment may consist of any combination of the following):    No data recorded  see initial evaluation   Subjective Comments:  no new complaints. Initial:  0    /10 Post Session:  0    /10  Medications Last Reviewed:  2022  Updated Objective Findings:  None Today  Treatment   TREATMENT:   THERAPEUTIC ACTIVITY: ( see below for minutes): Therapeutic activities per grid below to improve mobility, strength, balance and coordination. Required minimal visual, verbal, manual and tactile cues to improve independence and safety with daily activities . THERAPEUTIC EXERCISE: (see below for minutes): Exercises per grid below to improve mobility, strength, balance and coordination. Required minimal verbal and manual cues to promote proper body alignment, promote proper body posture and promote proper body mechanics. Progressed resistance, range, repetitions and complexity of movement as indicated.   MANUAL THERAPY: (see below for minutes): Joint mobilization and Soft tissue mobilization was utilized and necessary because of the patient's restricted joint motion, painful spasm, loss of articular motion and restricted motion of soft tissue. MODALITIES: (see below for minutes): to decrease pain   SELF CARE: (see below for minutes): Procedure(s) (per grid) utilized to improve and/or restore self-care/home management as related to dressing, bathing and grooming. Required minimal verbal cueing to facilitate activities of daily living skills and compensatory activities      Date: 5/23/22 (visit 3)  5/25/22 (visit 4) 5/27/22 (visit 5)   Modalities: 10 min  10 min  10 min   Ice 10 min at end of session repeat repeat               Therapeutic Exercise: 45 min  45 min  45 min     Stretching: log roll into IR; passive ROM into flexion to 90 °  Stretching: log roll into IR; PROM into hip flexion to 90 °   Isometrics: as before   Bike x 6 min   Stretching: log roll; sustained jhon stretch 5 min total   Isometrics as before  Pelvic tilts 20x    Isometrics: [id number]  Trunk activation   glute set  Quad set  Hip adduction  Hip abduction  Hip extension    Pelvic tilts 20x  Bridge 3x10 cueing glute activation  Bike 5 min  Pelvic tilts 20x  Bridge 3x10 cueing glute activation   Bike 7 min   Jhon stretch [id number] with therapist assist Bridge 30x cueing glute activation                             Proprioceptive Activities:                        Manual Therapy:                  Functional Activities:                                  Treatment/Session Summary:    · Treatment Assessment:   patient performs all exercises with good technique. · Communication/Consultation:  None today  · Equipment provided today:  None  · Recommendations/Intent for next treatment session: Next visit will focus on progression of strength and return to prior level of function.     Total Treatment Billable Duration:   55 minutes  Time In: 7851  Time Out: 1108 Phil Sow, PT Post Session Pain  Charge Capture  [employer] Mobile Portal  MD Guidelines  Scanned Media  Benefits  MyChart

## 2022-05-31 ENCOUNTER — HOSPITAL ENCOUNTER (OUTPATIENT)
Dept: PHYSICAL THERAPY | Age: 22
Setting detail: RECURRING SERIES
Discharge: HOME | End: 2022-06-03
Payer: COMMERCIAL

## 2022-05-31 PROCEDURE — 97110 THERAPEUTIC EXERCISES: CPT

## 2022-05-31 NOTE — PROGRESS NOTES
Bruce Berrios  : 2000  Primary: Gabbi 4752  Secondary: Alanna [phone] Kranthi Doss @ [address]  [facility] [address]  Phone: [phone]  Fax: [phone] No data recorded  No data recorded    PT Visit Info: Total # of Visits to Date: 4        L hip labral repair DOS 22   OUTPATIENT PHYSICAL AVRIANP:TK NOTE TYPE: Treatment Note 2022     Appt Desk   Episode      Treatment Diagnosis:  Pain in left hip (M25.552)  Stiffness of Left Hip, Not elsewhere classified (L18.501)  Medical/Referring Diagnosis:  Tear of left acetabular labrum, initial encounter [D10.906Z]  Referring Physician:  Holli Cabrera MD MD Orders:  PT Eval and Treat   Date of Onset:  No data recorded  Date of surgery: 22  Allergies:  Patient has no allergy information on record. see initial evaluation   Restrictions/Precautions:    No data recordedNo data recorded  per physician protocol included in paper chart  Interventions Planned (Treatment may consist of any combination of the following):    No data recorded  see initial evaluation   Subjective Comments:  feels like the hip is continuing to improve. no longer taking pain medication. Initial:  0    /10 Post Session:  0    /10  Medications Last Reviewed:  2022  Updated Objective Findings:  None Today  Treatment   TREATMENT:   THERAPEUTIC ACTIVITY: ( see below for minutes): Therapeutic activities per grid below to improve mobility, strength, balance and coordination. Required minimal visual, verbal, manual and tactile cues to improve independence and safety with daily activities . THERAPEUTIC EXERCISE: (see below for minutes): Exercises per grid below to improve mobility, strength, balance and coordination. Required minimal verbal and manual cues to promote proper body alignment, promote proper body posture and promote proper body mechanics.  Progressed resistance, range, repetitions and complexity of movement as indicated. MANUAL THERAPY: (see below for minutes): Joint mobilization and Soft tissue mobilization was utilized and necessary because of the patient's restricted joint motion, painful spasm, loss of articular motion and restricted motion of soft tissue. MODALITIES: (see below for minutes): to decrease pain   SELF CARE: (see below for minutes): Procedure(s) (per grid) utilized to improve and/or restore self-care/home management as related to dressing, bathing and grooming.  Required minimal verbal cueing to facilitate activities of daily living skills and compensatory activities      Date: 5/23/22 (visit 3)  5/25/22 (visit 4) 5/27/22 (visit 5) 5/31/22 (visit 6)   Modalities: 10 min  10 min  10 min 10 min    Ice 10 min at end of session repeat repeat repeat                 Therapeutic Exercise: 45 min  45 min  45 min  45 min     Stretching: log roll into IR; passive ROM into flexion to 90 °  Stretching: log roll into IR; PROM into hip flexion to 90 °   Isometrics: as before   Bike x 6 min   Stretching: log roll; sustained jhon stretch 5 min total   Isometrics as before  Pelvic tilts 20x Bike x 6 min  Stool IR rotations 40x  Jhon stretch [id number]  Bridge 40x  Isometrics: hip adduction, abduction, extension [id number]    Isometrics: [id number]  Trunk activation   glute set  Quad set  Hip adduction  Hip abduction  Hip extension    Pelvic tilts 20x  Bridge 3x10 cueing glute activation  Bike 5 min  Pelvic tilts 20x  Bridge 3x10 cueing glute activation   Bike 7 min   Jhon stretch [id number] with therapist assist Bridge 30x cueing glute activation   melissa curl ups 40x  Pelvic tilts 40x  Prone hip extension 40x                               Proprioceptive Activities:                            Manual Therapy:                     Functional Activities:                                       Treatment/Session Summary:    · Treatment Assessment:   progressed with prone hip extension exercise to improve posterior chain development and strength. · Communication/Consultation:  None today  · Equipment provided today:  None  · Recommendations/Intent for next treatment session: Next visit will focus on progression of strength and return to prior level of function.     Total Treatment Billable Duration:   55 minutes  Time In: 0800  Time Out: 3325    Nessa rUiarte PT       Post Session Pain  Charge Capture  [place] Portal  MD Guidelines  Scanned Media  Benefits  [email or web address]

## 2022-06-02 ENCOUNTER — OFFICE VISIT (OUTPATIENT)
Dept: ORTHOPEDIC SURGERY | Age: 22
End: 2022-06-02

## 2022-06-02 DIAGNOSIS — S73.192D TEAR OF LEFT ACETABULAR LABRUM, SUBSEQUENT ENCOUNTER: ICD-10-CM

## 2022-06-02 DIAGNOSIS — M25.859 FEMORAL ACETABULAR IMPINGEMENT: ICD-10-CM

## 2022-06-02 DIAGNOSIS — Z09 S/P ORTHOPEDIC SURGERY, FOLLOW-UP EXAM: Primary | ICD-10-CM

## 2022-06-02 PROCEDURE — 99024 POSTOP FOLLOW-UP VISIT: CPT | Performed by: ORTHOPAEDIC SURGERY

## 2022-06-02 NOTE — PROGRESS NOTES
Name: Esther Ortiz  YOB: 2000  Gender: male  MRN: [id number]    Procedure Performed:1) left hip arthroscopy with labral repair  2) left hip arthroscopy with Cam femoroplasty  3) left hip arthroscopy with rim trimming acetabuloplasty       Date of Procedure: 5/18/22          Subjective:Returns 2 weeks status post the above procedure. He is doing very well has no significant pain      Physical Examination:Incisions clean dry and intact, no sign of infection. Motor and sensory intact. Tolerates flexion past 90 degrees circumduction without pain sensory intact      Assessment:   1. S/P orthopedic surgery, follow-up exam    2. Tear of left acetabular labrum, subsequent encounter    3. Femoral acetabular impingement         Plan:   Doing well. .   Continue PT per hip arthroscopy labral repair protocol.   We can begin a slow weightbearing progression as his symptoms allow  Follow up in 4 weeks

## 2022-06-06 ENCOUNTER — HOSPITAL ENCOUNTER (OUTPATIENT)
Dept: PHYSICAL THERAPY | Age: 22
Setting detail: RECURRING SERIES
Discharge: HOME | End: 2022-06-09
Payer: COMMERCIAL

## 2022-06-06 PROCEDURE — 97110 THERAPEUTIC EXERCISES: CPT

## 2022-06-06 NOTE — PROGRESS NOTES
Elsa Diaz  : 2000  Primary: Gabbi 4752  Secondary: Alanna [phone] Kranthi Doss @ [address]  [facility] [address]  Phone: [phone]  Fax: [phone] No data recorded  No data recorded    PT Visit Info: Total # of Visits to Date: 4        L hip labral repair DOS 22   OUTPATIENT PHYSICAL CPWVSLX:LX NOTE TYPE: Treatment Note 2022     Appt Desk   Episode      Treatment Diagnosis:  Pain in left hip (M25.552)  Stiffness of Left Hip, Not elsewhere classified (K91.192)  Medical/Referring Diagnosis:  Tear of left acetabular labrum, initial encounter [G87.007N]  Referring Physician:  Jerrod Modi MD MD Orders:  PT Eval and Treat   Date of Onset:  No data recorded  Date of surgery: 22  Allergies:  Patient has no allergy information on record. see initial evaluation   Restrictions/Precautions:    No data recordedNo data recorded  per physician protocol included in paper chart  Interventions Planned (Treatment may consist of any combination of the following):    No data recorded  see initial evaluation   Subjective Comments:    had follow up with MD and was cleared to progress with WB. Has been ambulating with 1 crutch over the weekend with no joint soreness or discomfort. Initial:  0    /10 Post Session:  0    /10  Medications Last Reviewed:  2022  Updated Objective Findings:  None Today  Treatment   TREATMENT:   THERAPEUTIC ACTIVITY: ( see below for minutes): Therapeutic activities per grid below to improve mobility, strength, balance and coordination. Required minimal visual, verbal, manual and tactile cues to improve independence and safety with daily activities . THERAPEUTIC EXERCISE: (see below for minutes): Exercises per grid below to improve mobility, strength, balance and coordination.  Required minimal verbal and manual cues to promote proper body alignment, promote proper body posture and promote proper body mechanics. Progressed resistance, range, repetitions and complexity of movement as indicated. MANUAL THERAPY: (see below for minutes): Joint mobilization and Soft tissue mobilization was utilized and necessary because of the patient's restricted joint motion, painful spasm, loss of articular motion and restricted motion of soft tissue. MODALITIES: (see below for minutes): to decrease pain   SELF CARE: (see below for minutes): Procedure(s) (per grid) utilized to improve and/or restore self-care/home management as related to dressing, bathing and grooming. Required minimal verbal cueing to facilitate activities of daily living skills and compensatory activities      Date: 5/31/22 (visit 6) 6/5/22 (visit 7)    Modalities: 10 min  10 min     Ice repeat repeat                Therapeutic Exercise: 45 min  40  Min      Bike x 6 min  Stool IR rotations 40x  Jhon stretch [id number]  Bridge 40x  Isometrics: hip adduction, abduction, extension [id number]      melissa curl ups 40x  Pelvic tilts 40x  Prone hip extension 40x     Bike  6 min     Stretching  Therapist assisted jhon stretch [id number], prone hip IR 2v90pit, prone quad stretch 4q14hkh    Active warm up  Glute bridge 58p7tnb  SL reverse clamshell 3x10  SL hip abduction 3x10    Prone hip extension  Cueing glute activation 4x10    Incline board  Calf stretch x1 min   Heel raise 3x20                            Proprioceptive Activities:                        Manual Therapy:                  Functional Activities:                                  Treatment/Session Summary:    · Treatment Assessment:     demonstrates sufficient healing to discontinue use of crutch for household ambulation. Advised to limit prolonged community ambulation over the next week without assistive device, but will gradually progress with community mobility.    · Communication/Consultation:  None today  · Equipment provided today:  None  · Recommendations/Intent for next treatment session: Next visit will focus on progression of strength and return to prior level of function.     Total Treatment Billable Duration:   50 minutes  Time In: 0702  Time Out: 1635 [facility] Session Pain  Charge Capture  [employer] Portal  MD Guidelines  Scanned Media  Benefits  [employer]

## 2022-06-08 ENCOUNTER — HOSPITAL ENCOUNTER (OUTPATIENT)
Dept: PHYSICAL THERAPY | Age: 22
Setting detail: RECURRING SERIES
Discharge: HOME | End: 2022-06-11
Payer: COMMERCIAL

## 2022-06-08 PROCEDURE — 97110 THERAPEUTIC EXERCISES: CPT

## 2022-06-08 NOTE — PROGRESS NOTES
Emerson Paz  : 2000  Primary: Gabbi 4752  Secondary: Alanna [phone] Kranthi Doss @ [address]  [facility] [address]  Phone: [phone]  Fax: [phone] No data recorded  No data recorded    PT Visit Info: Total # of Visits to Date: 4        L hip labral repair DOS 22   OUTPATIENT PHYSICAL LALKSDE:LL NOTE TYPE: Treatment Note 2022     Appt Desk   Episode      Treatment Diagnosis:  Pain in left hip (M25.552)  Stiffness of Left Hip, Not elsewhere classified (B90.125)  Medical/Referring Diagnosis:  Tear of left acetabular labrum, initial encounter [Z81.481I]  Referring Physician:  Jonatan Butler MD MD Orders:  PT Eval and Treat   Date of Onset:  No data recorded  Date of surgery: 22  Allergies:  Patient has no allergy information on record. see initial evaluation   Restrictions/Precautions:    No data recordedNo data recorded  per physician protocol included in paper chart  Interventions Planned (Treatment may consist of any combination of the following):    No data recorded  see initial evaluation   Subjective Comments:    no soreness with progress to community ambulation without crutch. .   Initial:  0    /10 Post Session:  0    /10  Medications Last Reviewed:  2022  Updated Objective Findings:  None Today  Treatment   TREATMENT:   THERAPEUTIC ACTIVITY: ( see below for minutes): Therapeutic activities per grid below to improve mobility, strength, balance and coordination. Required minimal visual, verbal, manual and tactile cues to improve independence and safety with daily activities . THERAPEUTIC EXERCISE: (see below for minutes): Exercises per grid below to improve mobility, strength, balance and coordination. Required minimal verbal and manual cues to promote proper body alignment, promote proper body posture and promote proper body mechanics.  Progressed resistance, range, repetitions and complexity of movement as indicated. MANUAL THERAPY: (see below for minutes): Joint mobilization and Soft tissue mobilization was utilized and necessary because of the patient's restricted joint motion, painful spasm, loss of articular motion and restricted motion of soft tissue. MODALITIES: (see below for minutes): to decrease pain   SELF CARE: (see below for minutes): Procedure(s) (per grid) utilized to improve and/or restore self-care/home management as related to dressing, bathing and grooming. Required minimal verbal cueing to facilitate activities of daily living skills and compensatory activities      Date: 5/31/22 (visit 6) 6/5/22 (visit 7) 6/8/22 (visit 8)   Modalities: 10 min  10 min  10 min    Ice repeat repeat repeat               Therapeutic Exercise: 45 min  40  Min  45 min     Bike x 6 min  Stool IR rotations 40x  Jhon stretch 7a11wkv  Bridge 40x  Isometrics: hip adduction, abduction, extension [id number]      melissa curl ups 40x  Pelvic tilts 40x  Prone hip extension 40x     Bike  6 min  6 min    Stretching  Therapist assisted jhon stretch 3o99jzk, prone hip IR 0m09ouh, prone quad stretch 8w23elo repeat   Active warm up  Glute bridge 04y2pug  SL reverse clamshell 3x10  SL hip abduction 3x10 glute bridge 83m9tnu, with kick out 2x10  SL reverse clamshell 3x15  SL hip abduction 3x15   Prone hip extension  Cueing glute activation 4x10    Incline board  Calf stretch x1 min   Heel raise 3x20 Calf stretch 1 min    Step ups   Forward 10\" 30x  Lateral 10\" 20x   Squat   1/4 squat 3x10                Proprioceptive Activities:                        Manual Therapy:                  Functional Activities:                                  Treatment/Session Summary:    · Treatment Assessment:     during gait he continues to demonstrate decreased hip extension at terminal stance due to weakness of the glute musculature.  .   · Communication/Consultation:  None today  · Equipment provided today:  None  · Recommendations/Intent for next treatment session: Next visit will focus on progression of strength and return to prior level of function.     Total Treatment Billable Duration:   55 minutes  Time In: 4494  Time Out: 26[address] [place] Session Pain  Charge Capture  [employer] Portal  MD Guidelines  Scanned Media  Benefits  [employer]

## 2022-06-09 ENCOUNTER — HOSPITAL ENCOUNTER (OUTPATIENT)
Dept: PHYSICAL THERAPY | Age: 22
Setting detail: RECURRING SERIES
Discharge: HOME | End: 2022-06-12
Payer: COMMERCIAL

## 2022-06-09 PROCEDURE — 97110 THERAPEUTIC EXERCISES: CPT

## 2022-06-09 NOTE — PROGRESS NOTES
Kayode Desouza  : 2000  Primary: Gabbi 4752  Secondary: Alanna [phone] Kranthi Doss @ [address]  [facility] [address]  Phone: [phone]  Fax: [phone] No data recorded  No data recorded    PT Visit Info: Total # of Visits to Date: 4        L hip labral repair DOS 22   OUTPATIENT PHYSICAL CUPQFJO:LL NOTE TYPE: Treatment Note and Progress Note 2022     Appt Desk   Episode      Treatment Diagnosis:  Pain in left hip (M25.552)  Stiffness of Left Hip, Not elsewhere classified (P38.750)  Medical/Referring Diagnosis:  Tear of left acetabular labrum, initial encounter [G84.018F]  Referring Physician:  Malen Cabot, MD MD Orders:  PT Eval and Treat   Date of Onset:  No data recorded  Date of surgery: 22  Allergies:  Patient has no allergy information on record. see initial evaluation   Restrictions/Precautions:    No data recordedNo data recorded  per physician protocol included in paper chart  Interventions Planned (Treatment may consist of any combination of the following):    No data recorded  see initial evaluation   Subjective Comments:    notes some mild soreness through the anterior hip this morning, but this resolved with gentle movement. Feeling more comfortable with walking around campus. Initial:  0    /10 Post Session:  0    /10  Medications Last Reviewed:  2022  Updated Objective Findings:      Short-Term Functional Goals: Time Frame: 6 weeks  1. Patient will be independent with HEP. MET  2. Patient will demonstrate sufficient healing to progress to WBAT. MET  3. Patient will demonstrate symmetric step length with gait to improve stability during community mobility. IMPROVING  Discharge Goals: Time Frame: 16 weeks ONGOING  1. Patient will demonstrate symmetric single leg hop and stick to restore dynamic stability for return to prior level of function.    2. Patient will demonstrate >90% symmetric on isokinetic knee extension and flexion testing to normalize strength for return to prior level of function. 3. Patient will demonstrate 5/5 strength with single leg squat to restore limb strength for return to prior level of function. 6/9/22:   Hip flexion: 90 ° with normal end feel  Hip extension: 15 °   Hip IR: 30 °   Hip abduction: 4/5   Hip extension: 4/5       Treatment   TREATMENT:   THERAPEUTIC ACTIVITY: ( see below for minutes): Therapeutic activities per grid below to improve mobility, strength, balance and coordination. Required minimal visual, verbal, manual and tactile cues to improve independence and safety with daily activities . THERAPEUTIC EXERCISE: (see below for minutes): Exercises per grid below to improve mobility, strength, balance and coordination. Required minimal verbal and manual cues to promote proper body alignment, promote proper body posture and promote proper body mechanics. Progressed resistance, range, repetitions and complexity of movement as indicated. MANUAL THERAPY: (see below for minutes): Joint mobilization and Soft tissue mobilization was utilized and necessary because of the patient's restricted joint motion, painful spasm, loss of articular motion and restricted motion of soft tissue. MODALITIES: (see below for minutes): to decrease pain   SELF CARE: (see below for minutes): Procedure(s) (per grid) utilized to improve and/or restore self-care/home management as related to dressing, bathing and grooming.  Required minimal verbal cueing to facilitate activities of daily living skills and compensatory activities      Date: 5/31/22 (visit 6) 6/5/22 (visit 7) 6/8/22 (visit 8) 6/9/22 (visit 9) Progress Note     Modalities: 10 min  10 min  10 min  10 min      Ice repeat repeat repeat 10 min to anterior hip                       Therapeutic Exercise: 45 min  40  Min  45 min  45 min       Bike x 6 min  Stool IR rotations 40x  Jhon stretch 6x77fjo  Bridge 40x  Isometrics: hip adduction, abduction, extension [id number]         melissa curl ups 40x  Pelvic tilts 40x  Prone hip extension 40x        Bike  6 min  6 min  6 min      Stretching  Therapist assisted cornell stretch [id number], prone hip IR 1h59tzt, prone quad stretch 4n33zva repeat Therapist assisted cornell stretch at side of table 3b73dpp  Prone hip IR stretch 5w67oni therapist assisted  Prone quad stretch 0v29axf therapist assisted     Active warm up  Glute bridge 63x6yre  SL reverse clamshell 3x10  SL hip abduction 3x10 glute bridge 18h2gal, with kick out 2x10  SL reverse clamshell 3x15  SL hip abduction 3x15 glute bridge 10x, bridge with kick out 2x10 with 5 sec hold  sidelying reverse clamshell 3x15  sidelying hip abduction 3x15     Prone hip extension  Cueing glute activation 4x10       Incline board  Calf stretch x1 min   Heel raise 3x20 Calf stretch 1 min  Calf stretch 1 min  Calf raise 3x15     Step ups   Forward 10\" 30x  Lateral 10\" 20x Forward 10\" 30x  Lateral 10\" 30x     Squat   1/4 squat 3x10  1/4 squat 3x10      Reverse hyperextensions    At edge of table 3x10              Proprioceptive Activities:                                    Manual Therapy:                           Functional Activities:                                                 Treatment/Session Summary:    · Treatment Assessment:     gait pattern is improving with upright posture and symmetric step length with normalizing hip extension on the L during terminal stance. Will continue to restrict ROM into hip flexion to 90 ° and will hold on progressing external rotation stretching activities for one more week. · Communication/Consultation:  None today  · Equipment provided today:  None  · Recommendations/Intent for next treatment session: Next visit will focus on progression of strength and return to prior level of function.     Total Treatment Billable Duration:   55 minutes  Time In: 2234  Time Out: [address] [facility] Session Pain  Charge Capture  [employer] Portal  MD Guidelines  Scanned Media  Benefits  [employer]

## 2022-06-10 ENCOUNTER — APPOINTMENT (OUTPATIENT)
Dept: PHYSICAL THERAPY | Age: 22
End: 2022-06-10
Payer: COMMERCIAL

## 2022-06-14 ENCOUNTER — HOSPITAL ENCOUNTER (OUTPATIENT)
Dept: PHYSICAL THERAPY | Age: 22
Setting detail: RECURRING SERIES
Discharge: HOME | End: 2022-06-17
Payer: COMMERCIAL

## 2022-06-14 PROCEDURE — 97110 THERAPEUTIC EXERCISES: CPT

## 2022-06-14 ASSESSMENT — PAIN SCALES - GENERAL: PAINLEVEL_OUTOF10: 1

## 2022-06-16 ENCOUNTER — HOSPITAL ENCOUNTER (OUTPATIENT)
Dept: PHYSICAL THERAPY | Age: 22
Setting detail: RECURRING SERIES
Discharge: HOME | End: 2022-06-19
Payer: COMMERCIAL

## 2022-06-16 PROCEDURE — 97110 THERAPEUTIC EXERCISES: CPT

## 2022-06-16 NOTE — PROGRESS NOTES
Kayode Desouza  : 2000  Primary: [address]  Secondary: [phone] Kranthi Dsos @ [address]  [facility] [address]  Phone: [phone]  Fax: [phone] No data recorded  No data recorded    PT Visit Info: Total # of Visits to Date: 11        L hip labral repair DOS 22   OUTPATIENT PHYSICAL OJOCXJO:JE NOTE TYPE: Treatment Note and Progress Note 2022     Appt Desk   Episode      Treatment Diagnosis:  Pain in left hip (M25.552)  Stiffness of Left Hip, Not elsewhere classified (P93.222)  Medical/Referring Diagnosis:  Tear of left acetabular labrum, initial encounter [B78.545V]  Referring Physician:  Malen Cabot, MD MD Orders:  PT Eval and Treat   Date of Onset:  No data recorded  Date of surgery: 22  Allergies:  Patient has no allergy information on record. see initial evaluation   Restrictions/Precautions:    No data recordedNo data recorded  per physician protocol included in paper chart  Interventions Planned (Treatment may consist of any combination of the following):    No data recorded  see initial evaluation   Subjective Comments:     . Reports he is glad to be off the crutches. Initial:  0    /10 Post Session:  0    /10  Medications Last Reviewed:  2022  Updated Objective Findings:      Short-Term Functional Goals: Time Frame: 6 weeks  1. Patient will be independent with HEP. MET  2. Patient will demonstrate sufficient healing to progress to WBAT. MET  3. Patient will demonstrate symmetric step length with gait to improve stability during community mobility. IMPROVING  Discharge Goals: Time Frame: 16 weeks ONGOING  1. Patient will demonstrate symmetric single leg hop and stick to restore dynamic stability for return to prior level of function. 2. Patient will demonstrate >90% symmetric on isokinetic knee extension and flexion testing to normalize strength for return to prior level of function.    3. Patient will demonstrate 5/5 strength with single leg squat to restore limb strength for return to prior level of function. 6/9/22:   Hip flexion: 90 ° with normal end feel  Hip extension: 15 °   Hip IR: 30 °   Hip abduction: 4/5   Hip extension: 4/5       Treatment   TREATMENT:   THERAPEUTIC ACTIVITY: ( see below for minutes): Therapeutic activities per grid below to improve mobility, strength, balance and coordination. Required minimal visual, verbal, manual and tactile cues to improve independence and safety with daily activities . THERAPEUTIC EXERCISE: (see below for minutes): Exercises per grid below to improve mobility, strength, balance and coordination. Required minimal verbal and manual cues to promote proper body alignment, promote proper body posture and promote proper body mechanics. Progressed resistance, range, repetitions and complexity of movement as indicated. MANUAL THERAPY: (see below for minutes): Joint mobilization and Soft tissue mobilization was utilized and necessary because of the patient's restricted joint motion, painful spasm, loss of articular motion and restricted motion of soft tissue. MODALITIES: (see below for minutes): to decrease pain   SELF CARE: (see below for minutes): Procedure(s) (per grid) utilized to improve and/or restore self-care/home management as related to dressing, bathing and grooming.  Required minimal verbal cueing to facilitate activities of daily living skills and compensatory activities      Date: 6/5/22 (visit 7) 6/8/22 (visit 8) 6/9/22 (visit 9) Progress Note 6/14/22 (visit 10) 6/16/22 (visit 11)   Modalities: 10 min  10 min  10 min  10 min    Ice repeat repeat 10 min to anterior hip 10 min to anterior hip                    Therapeutic Exercise: 40  Min  45 min  45 min  40 min 40 min                   Bike 6 min  6 min  6 min  6 min 6 min   Stretching Therapist assisted cornell stretch [id number], prone hip IR [id number], prone quad stretch [id number] repeat Therapist assisted cornell stretch at side of table [id number]  Prone hip IR stretch [id number] therapist assisted  Prone quad stretch [id number] therapist assisted Therapist assisted cornell stretch at side of table [id number]  Prone hip IR stretch 1o63poz therapist assisted  Prone quad stretch 2o66csi therapist assisted Therapist assisted cornell stretch at side of table [id number]  Prone hip IR stretch 1q87ves therapist assisted  Prone quad stretch [id number] therapist assisted   Active warm up Glute bridge [id number]  SL reverse clamshell 3x10  SL hip abduction 3x10 glute bridge [id number], with kick out 2x10  SL reverse clamshell 3x15  SL hip abduction 3x15 glute bridge 10x, bridge with kick out 2x10 with 5 sec hold  sidelying reverse clamshell 3x15  sidelying hip abduction 3x15 glute bridge 10x, bridge with kick out 2x10 with 5 sec hold  sidelying reverse clamshell 3x15  sidelying hip abduction 3x15 glute bridge 10x, bridge with kick out 2x10 with 5 sec hold  sidelying reverse clamshell 3x15  sidelying hip abduction 3x15   Prone hip extension Cueing glute activation 4x10   -    Incline board Calf stretch x1 min   Heel raise 3x20 Calf stretch 1 min  Calf stretch 1 min  Calf raise 3x15 Calf raise 3x15 Calf raise 3x15   Step ups  Forward 10\" 30x  Lateral 10\" 20x Forward 10\" 30x  Lateral 10\" 30x Forward 10\" 30x  Lateral 10\" 30x Forward 3x15  Lateral 3x15   Squat  1/4 squat 3x10  1/4 squat 3x10  - -   Reverse hyperextensions   At edge of table 3x10 - At edge of table 3x10        Partial reverse lunges 2x10   Proprioceptive Activities:                                Manual Therapy:                        Functional Activities:                                            Treatment/Session Summary:    · Treatment Assessment:   Patient notes no pain after getting off crutches. Seems to be progressing well.     · Communication/Consultation:  None today  · Equipment provided today:  None  · Recommendations/Intent for next treatment session: Next visit will focus on progression of strength and return to prior level of function.     Total Treatment Billable Duration:   40 minutes  Time In: 0172  Time Out: 0940    Priscilla Vera, PT       Post Session Pain  Charge Capture  [employer] Portal  MD Guidelines  Scanned Media  Benefits  [employer]

## 2022-06-20 ENCOUNTER — HOSPITAL ENCOUNTER (OUTPATIENT)
Dept: PHYSICAL THERAPY | Age: 22
Setting detail: RECURRING SERIES
Discharge: HOME | End: 2022-06-23
Payer: COMMERCIAL

## 2022-06-20 PROCEDURE — 97110 THERAPEUTIC EXERCISES: CPT

## 2022-06-20 NOTE — PROGRESS NOTES
Osvaldo Stack  : 2000  Primary: [address]  Secondary: [phone] Kranthi Doss @ [address]  [facility] [address]  Phone: [phone]  Fax: [phone] No data recorded  No data recorded    PT Visit Info: Total # of Visits to Date: 11        L hip labral repair DOS 22   OUTPATIENT PHYSICAL CIKZDNA:PX NOTE TYPE: Treatment Note and Progress Note 2022     Appt Desk   Episode      Treatment Diagnosis:  Pain in left hip (M25.552)  Stiffness of Left Hip, Not elsewhere classified (D36.994)  Medical/Referring Diagnosis:  Tear of left acetabular labrum, initial encounter [S54.289Y]  Referring Physician:  Justina Wallace MD MD Orders:  PT Eval and Treat   Date of Onset:  No data recorded  Date of surgery: 22  Allergies:  Patient has no allergy information on record. see initial evaluation   Restrictions/Precautions:    No data recordedNo data recorded  per physician protocol included in paper chart  Interventions Planned (Treatment may consist of any combination of the following):    No data recorded  see initial evaluation   Subjective Comments:    notes the hip has continued to improve. Only experiencing intermittent, minimal soreness. Initial:  0    /10 Post Session:  0    /10  Medications Last Reviewed:  2022  Updated Objective Findings:      Short-Term Functional Goals: Time Frame: 6 weeks  1. Patient will be independent with HEP. MET  2. Patient will demonstrate sufficient healing to progress to WBAT. MET  3. Patient will demonstrate symmetric step length with gait to improve stability during community mobility. IMPROVING  Discharge Goals: Time Frame: 16 weeks ONGOING  1. Patient will demonstrate symmetric single leg hop and stick to restore dynamic stability for return to prior level of function.    2. Patient will demonstrate >90% symmetric on isokinetic knee extension and flexion testing to normalize strength for return to prior level of function. 3. Patient will demonstrate 5/5 strength with single leg squat to restore limb strength for return to prior level of function. 6/9/22:   Hip flexion: 90 ° with normal end feel  Hip extension: 15 °   Hip IR: 30 °   Hip abduction: 4/5   Hip extension: 4/5       Treatment   TREATMENT:   THERAPEUTIC ACTIVITY: ( see below for minutes): Therapeutic activities per grid below to improve mobility, strength, balance and coordination. Required minimal visual, verbal, manual and tactile cues to improve independence and safety with daily activities . THERAPEUTIC EXERCISE: (see below for minutes): Exercises per grid below to improve mobility, strength, balance and coordination. Required minimal verbal and manual cues to promote proper body alignment, promote proper body posture and promote proper body mechanics. Progressed resistance, range, repetitions and complexity of movement as indicated. MANUAL THERAPY: (see below for minutes): Joint mobilization and Soft tissue mobilization was utilized and necessary because of the patient's restricted joint motion, painful spasm, loss of articular motion and restricted motion of soft tissue. MODALITIES: (see below for minutes): to decrease pain   SELF CARE: (see below for minutes): Procedure(s) (per grid) utilized to improve and/or restore self-care/home management as related to dressing, bathing and grooming.  Required minimal verbal cueing to facilitate activities of daily living skills and compensatory activities      Date: 6/9/22 (visit 9) Progress Note 6/14/22 (visit 10) 6/16/22 (visit 11) 6/20/22 (visit 12)     Modalities: 10 min  10 min  10 min     Ice 10 min to anterior hip 10 min to anterior hip  10 min to anterior hip                    Therapeutic Exercise: 45 min  40 min 40 min 45 min                     Bike 6 min  6 min 6 min 5 min     Stretching Therapist assisted cornell stretch at side of table [id number]  Prone hip IR stretch [id number] therapist assisted  Prone quad stretch 9x73whm therapist assisted Therapist assisted cornell stretch at side of table [id number]  Prone hip IR stretch 8b69glb therapist assisted  Prone quad stretch [id number] therapist assisted Therapist assisted cornell stretch at side of table [id number]  Prone hip IR stretch [id number] therapist assisted  Prone quad stretch 3t70ehb therapist assisted Bent knee fall outs 20x    Active warm up glute bridge 10x, bridge with kick out 2x10 with 5 sec hold  sidelying reverse clamshell 3x15  sidelying hip abduction 3x15 glute bridge 10x, bridge with kick out 2x10 with 5 sec hold  sidelying reverse clamshell 3x15  sidelying hip abduction 3x15 glute bridge 10x, bridge with kick out 2x10 with 5 sec hold  sidelying reverse clamshell 3x15  sidelying hip abduction 3x15 Clamshell 4x10  Reverse clamshell 3x10    Prone hip extension  -      Incline board Calf stretch 1 min  Calf raise 3x15 Calf raise 3x15 Calf raise 3x15 Calf raise 3x20    Step ups Forward 10\" 30x  Lateral 10\" 30x Forward 10\" 30x  Lateral 10\" 30x Forward 3x15  Lateral 3x15     Squat 1/4 squat 3x10  - - Goblet 15# 4x10    Reverse hyperextensions At edge of table 3x10 - At edge of table 3x10     Lateral band walk    Green loop 3x10; band kick backs 3x10    HS bridge    With heels in chair 40x                       Partial reverse lunges 2x10 Reverse lunges to 2 airex 4x8    Proprioceptive Activities:                                Manual Therapy:                        Functional Activities:                                            Treatment/Session Summary:    Treatment Assessment:      Demonstrates good frontal plane control of the knee with reverse lunges. Communication/Consultation:  None today  Equipment provided today:  None  Recommendations/Intent for next treatment session: Next visit will focus on progression of strength and return to prior level of function.     Total Treatment Billable Duration:   55 minutes  Time In: 7211  Time Out: 2600 [place] Session Pain  Charge Capture  [employer] Portal  MD Guidelines  Scanned Media  Benefits  MyChart

## 2022-06-22 ENCOUNTER — HOSPITAL ENCOUNTER (OUTPATIENT)
Dept: PHYSICAL THERAPY | Age: 22
Setting detail: RECURRING SERIES
Discharge: HOME | End: 2022-06-25
Payer: COMMERCIAL

## 2022-06-22 PROCEDURE — 97110 THERAPEUTIC EXERCISES: CPT

## 2022-06-22 NOTE — PROGRESS NOTES
Jose Miguel Lorenzo  : 2000  Primary: [address]  Secondary: [phone] Kranthi Doss @ [address]  [facility] [address]  Phone: [phone]  Fax: [phone] No data recorded  No data recorded    PT Visit Info: Total # of Visits to Date: 11          L hip labral repair DOS 22   OUTPATIENT PHYSICAL TTNAUML:MS NOTE TYPE: Treatment Note and Progress Note 2022     Appt Desk   Episode      Treatment Diagnosis:  Pain in left hip (M25.552)  Stiffness of Left Hip, Not elsewhere classified (H77.515)  Medical/Referring Diagnosis:  Tear of left acetabular labrum, initial encounter [J42.809X]  Referring Physician:  Elma Beth MD MD Orders:  PT Eval and Treat   Date of Onset:  No data recorded  Date of surgery: 22  Allergies:  Patient has no allergy information on record. see initial evaluation   Restrictions/Precautions:    No data recordedNo data recorded  per physician protocol included in paper chart  Interventions Planned (Treatment may consist of any combination of the following):    No data recorded  see initial evaluation   Subjective Comments:    soreness continues to improve. Feeling comfortable with walking around on a day to day basis. Initial:  0    /10 Post Session:  0    /10  Medications Last Reviewed:  2022  Updated Objective Findings:      Short-Term Functional Goals: Time Frame: 6 weeks  1. Patient will be independent with HEP. MET  2. Patient will demonstrate sufficient healing to progress to WBAT. MET  3. Patient will demonstrate symmetric step length with gait to improve stability during community mobility. IMPROVING  Discharge Goals: Time Frame: 16 weeks ONGOING  1. Patient will demonstrate symmetric single leg hop and stick to restore dynamic stability for return to prior level of function.    2. Patient will demonstrate >90% symmetric on isokinetic knee extension and flexion testing to normalize strength for return to prior level of function. 3. Patient will demonstrate 5/5 strength with single leg squat to restore limb strength for return to prior level of function. 6/9/22:   Hip flexion: 90 ° with normal end feel  Hip extension: 15 °   Hip IR: 30 °   Hip abduction: 4/5   Hip extension: 4/5       Treatment   TREATMENT:   THERAPEUTIC ACTIVITY: ( see below for minutes): Therapeutic activities per grid below to improve mobility, strength, balance and coordination. Required minimal visual, verbal, manual and tactile cues to improve independence and safety with daily activities . THERAPEUTIC EXERCISE: (see below for minutes): Exercises per grid below to improve mobility, strength, balance and coordination. Required minimal verbal and manual cues to promote proper body alignment, promote proper body posture and promote proper body mechanics. Progressed resistance, range, repetitions and complexity of movement as indicated. MANUAL THERAPY: (see below for minutes): Joint mobilization and Soft tissue mobilization was utilized and necessary because of the patient's restricted joint motion, painful spasm, loss of articular motion and restricted motion of soft tissue. MODALITIES: (see below for minutes): to decrease pain   SELF CARE: (see below for minutes): Procedure(s) (per grid) utilized to improve and/or restore self-care/home management as related to dressing, bathing and grooming.  Required minimal verbal cueing to facilitate activities of daily living skills and compensatory activities      Date: 6/9/22 (visit 9) Progress Note 6/14/22 (visit 10) 6/16/22 (visit 11) 6/20/22 (visit 12)  6/23/22 (visit 13)    Modalities: 10 min  10 min  10 min  10 min   Ice 10 min to anterior hip 10 min to anterior hip  10 min to anterior hip 10 min to anterior hip                   Therapeutic Exercise: 45 min  40 min 40 min 45 min  45 min                    Bike 6 min  6 min 6 min 5 min  5 min    Stretching Therapist assisted cornell stretch at side of table [id number]  Prone hip IR stretch [id number] therapist assisted  Prone quad stretch [id number] therapist assisted Therapist assisted cornell stretch at side of table [id number]  Prone hip IR stretch [id number] therapist assisted  Prone quad stretch [id number] therapist assisted Therapist assisted cornell stretch at side of table [id number]  Prone hip IR stretch [id number] therapist assisted  Prone quad stretch 2g72blq therapist assisted Bent knee fall outs 20x Bent knee fall outs 20x, prone hip IR stretch [id number], prone quad stretch [id number]   Active warm up glute bridge 10x, bridge with kick out 2x10 with 5 sec hold  sidelying reverse clamshell 3x15  sidelying hip abduction 3x15 glute bridge 10x, bridge with kick out 2x10 with 5 sec hold  sidelying reverse clamshell 3x15  sidelying hip abduction 3x15 glute bridge 10x, bridge with kick out 2x10 with 5 sec hold  sidelying reverse clamshell 3x15  sidelying hip abduction 3x15 Clamshell 4x10  Reverse clamshell 3x10 Clamshell 3x15  Reverse clamshell 3x15  Bridge with kick out [id number] B   Bird dogs [id number]   Prone hip extension  -      Incline board Calf stretch 1 min  Calf raise 3x15 Calf raise 3x15 Calf raise 3x15 Calf raise 3x20    Step ups Forward 10\" 30x  Lateral 10\" 30x Forward 10\" 30x  Lateral 10\" 30x Forward 3x15  Lateral 3x15     Squat 1/4 squat 3x10  - - Goblet 15# 4x10    Reverse hyperextensions At edge of table 3x10 - At edge of table 3x10     Lateral band walk    Green loop 3x10; band kick backs 3x10 Green loop 3 laps x15 steps; band kick backs 3x10   HS bridge    With heels in chair 40x SB hamstring curls 4x8                      Partial reverse lunges 2x10 Reverse lunges to 2 airex 4x8 Reverse lunge with slider 4x8   Proprioceptive Activities:                                Manual Therapy:                        Functional Activities:                                            Treatment/Session Summary:    · Treatment Assessment:     Progressed challenge on lunges with decreased use of hands for support. Demonstrates good control of balance. .   · Communication/Consultation:  None today  · Equipment provided today:  None  · Recommendations/Intent for next treatment session: Next visit will focus on progression of strength and return to prior level of function.     Total Treatment Billable Duration:   55 minutes  Time In: 2957  Time Out: 2600 [place] Session Pain  Charge Capture  [email or web address] Portal  MD Guidelines  Scanned Media  Benefits  [email or web address]

## 2022-06-24 ENCOUNTER — HOSPITAL ENCOUNTER (OUTPATIENT)
Dept: PHYSICAL THERAPY | Age: 22
Setting detail: RECURRING SERIES
Discharge: HOME | End: 2022-06-27
Payer: COMMERCIAL

## 2022-06-24 PROCEDURE — 97110 THERAPEUTIC EXERCISES: CPT

## 2022-06-24 NOTE — PROGRESS NOTES
Narendra Tovar  : 2000  Primary: Gabbi 4752  Secondary: Alanna [phone] Kranthi Doss @ [address]  [facility] [address]  Phone: [phone]  Fax: [phone] No data recorded  No data recorded    PT Visit Info: Total # of Visits to Date: 11          L hip labral repair DOS 22   OUTPATIENT PHYSICAL QKIKQGD:XP NOTE TYPE: Treatment Note and Progress Note 2022     Appt Desk   Episode      Treatment Diagnosis:  Pain in left hip (M25.552)  Stiffness of Left Hip, Not elsewhere classified (R98.315)  Medical/Referring Diagnosis:  Tear of left acetabular labrum, initial encounter [W88.150D]  Referring Physician:  Albania Lion MD MD Orders:  PT Eval and Treat   Date of Onset:  No data recorded  Date of surgery: 22  Allergies:  Patient has no allergy information on record. see initial evaluation   Restrictions/Precautions:    No data recordedNo data recorded  per physician protocol included in paper chart  Interventions Planned (Treatment may consist of any combination of the following):    No data recorded  see initial evaluation   Subjective Comments:    denies any soreness from previous session. Initial:  0    /10 Post Session:  0    /10  Medications Last Reviewed:  2022  Updated Objective Findings:      Short-Term Functional Goals: Time Frame: 6 weeks  1. Patient will be independent with HEP. MET  2. Patient will demonstrate sufficient healing to progress to WBAT. MET  3. Patient will demonstrate symmetric step length with gait to improve stability during community mobility. IMPROVING  Discharge Goals: Time Frame: 16 weeks ONGOING  1. Patient will demonstrate symmetric single leg hop and stick to restore dynamic stability for return to prior level of function. 2. Patient will demonstrate >90% symmetric on isokinetic knee extension and flexion testing to normalize strength for return to prior level of function.    3. Patient will demonstrate 5/5 strength with single leg squat to restore limb strength for return to prior level of function. 6/9/22:   Hip flexion: 90 ° with normal end feel  Hip extension: 15 °   Hip IR: 30 °   Hip abduction: 4/5   Hip extension: 4/5       Treatment   TREATMENT:   THERAPEUTIC ACTIVITY: ( see below for minutes): Therapeutic activities per grid below to improve mobility, strength, balance and coordination. Required minimal visual, verbal, manual and tactile cues to improve independence and safety with daily activities . THERAPEUTIC EXERCISE: (see below for minutes): Exercises per grid below to improve mobility, strength, balance and coordination. Required minimal verbal and manual cues to promote proper body alignment, promote proper body posture and promote proper body mechanics. Progressed resistance, range, repetitions and complexity of movement as indicated. MANUAL THERAPY: (see below for minutes): Joint mobilization and Soft tissue mobilization was utilized and necessary because of the patient's restricted joint motion, painful spasm, loss of articular motion and restricted motion of soft tissue. MODALITIES: (see below for minutes): to decrease pain   SELF CARE: (see below for minutes): Procedure(s) (per grid) utilized to improve and/or restore self-care/home management as related to dressing, bathing and grooming.  Required minimal verbal cueing to facilitate activities of daily living skills and compensatory activities      Date: 6/16/22 (visit 11) 6/20/22 (visit 12)  6/23/22 (visit 13)  6/24/22 (visit 14)    Modalities:  10 min  10 min 10 min     Ice  10 min to anterior hip 10 min to anterior hip 10 min to anterior hip                    Therapeutic Exercise: 40 min 45 min  45 min  45 min                    Bike 6 min 5 min  5 min  5 min     Stretching Therapist assisted cornell stretch at side of table [id number]  Prone hip IR stretch 1z91uxw therapist assisted  Prone quad stretch 8r70vus therapist assisted Bent knee fall outs 20x Bent knee fall outs 20x, prone hip IR stretch 2z39ghv, prone quad stretch 9b34hyx CLAUDIA stretch 6k11ezm; MWM into hip flexion with belt 30x    Active warm up glute bridge 10x, bridge with kick out 2x10 with 5 sec hold  sidelying reverse clamshell 3x15  sidelying hip abduction 3x15 Clamshell 4x10  Reverse clamshell 3x10 Clamshell 3x15  Reverse clamshell 3x15  Bridge with kick out [id number] B   Bird dogs [id number] Clamshell, reverse clamshell 3x15    Prone hip extension        Incline board Calf raise 3x15 Calf raise 3x20      Step ups Forward 3x15  Lateral 3x15   SL squat from 10\" step 4x8    Squat - Goblet 15# 4x10      Reverse hyperextensions At edge of table 3x10       Lateral band walk  Green loop 3x10; band kick backs 3x10 Green loop 3 laps x15 steps; band kick backs 3x10 Blue loop 3 laps x10 steps lateral, monster x 3 laps    HS bridge  With heels in chair 40x SB hamstring curls 4x8 SB hamstring curls 3x12    Planks    Side: 1u44riw to R side, 3x92iyj to L side             Partial reverse lunges 2x10 Reverse lunges to 2 airex 4x8 Reverse lunge with slider 4x8 Reverse lunge with slider 4x8    Proprioceptive Activities:                                Manual Therapy:                        Functional Activities:                                            Treatment/Session Summary:    · Treatment Assessment:     initiated more challenging core strengthening with good tolerance. · Communication/Consultation:  None today  · Equipment provided today:  None  · Recommendations/Intent for next treatment session: Next visit will focus on progression of strength and return to prior level of function.     Total Treatment Billable Duration:   55 minutes  Time In: 4623  Time Out: 2600 [place] Session Pain  Charge Capture  [employer] Portal  MD Guidelines  Scanned Media  Benefits  MyChart

## 2022-06-27 ENCOUNTER — HOSPITAL ENCOUNTER (OUTPATIENT)
Dept: PHYSICAL THERAPY | Age: 22
Setting detail: RECURRING SERIES
Discharge: HOME | End: 2022-06-30
Payer: COMMERCIAL

## 2022-06-27 PROCEDURE — 97110 THERAPEUTIC EXERCISES: CPT

## 2022-06-27 NOTE — PROGRESS NOTES
Kaiden Ureña  : 2000  Primary: [address] 4752  Secondary: [phone] Kranthi Doss @ [address]  [facility] [address]  Phone: [phone]  Fax: [phone] No data recorded  No data recorded    PT Visit Info: Total # of Visits to Date: 11          L hip labral repair DOS 22   OUTPATIENT PHYSICAL TEPTYXK:FB NOTE TYPE: Treatment Note and Progress Note 2022     Appt Desk   Episode      Treatment Diagnosis:  Pain in left hip (M25.552)  Stiffness of Left Hip, Not elsewhere classified (W77.683)  Medical/Referring Diagnosis:  Tear of left acetabular labrum, initial encounter [S08.011G]  Referring Physician:  Bridget Hinojosa MD MD Orders:  PT Eval and Treat   Date of Onset:  No data recorded  Date of surgery: 22  Allergies:  Patient has no allergy information on record. see initial evaluation   Restrictions/Precautions:    No data recordedNo data recorded  per physician protocol included in paper chart  Interventions Planned (Treatment may consist of any combination of the following):    No data recorded  see initial evaluation   Subjective Comments:    continues to feel like the hip is improving. Initial:  0    /10 Post Session:  0    /10  Medications Last Reviewed:  2022  Updated Objective Findings:      Short-Term Functional Goals: Time Frame: 6 weeks  1. Patient will be independent with HEP. MET  2. Patient will demonstrate sufficient healing to progress to WBAT. MET  3. Patient will demonstrate symmetric step length with gait to improve stability during community mobility. IMPROVING  Discharge Goals: Time Frame: 16 weeks ONGOING  1. Patient will demonstrate symmetric single leg hop and stick to restore dynamic stability for return to prior level of function. 2. Patient will demonstrate >90% symmetric on isokinetic knee extension and flexion testing to normalize strength for return to prior level of function.    3. Patient will demonstrate 5/5 strength with single leg squat to restore limb strength for return to prior level of function. 6/9/22:   Hip flexion: 90 ° with normal end feel  Hip extension: 15 °   Hip IR: 30 °   Hip abduction: 4/5   Hip extension: 4/5       Treatment   TREATMENT:   THERAPEUTIC ACTIVITY: ( see below for minutes): Therapeutic activities per grid below to improve mobility, strength, balance and coordination. Required minimal visual, verbal, manual and tactile cues to improve independence and safety with daily activities . THERAPEUTIC EXERCISE: (see below for minutes): Exercises per grid below to improve mobility, strength, balance and coordination. Required minimal verbal and manual cues to promote proper body alignment, promote proper body posture and promote proper body mechanics. Progressed resistance, range, repetitions and complexity of movement as indicated. MANUAL THERAPY: (see below for minutes): Joint mobilization and Soft tissue mobilization was utilized and necessary because of the patient's restricted joint motion, painful spasm, loss of articular motion and restricted motion of soft tissue. MODALITIES: (see below for minutes): to decrease pain   SELF CARE: (see below for minutes): Procedure(s) (per grid) utilized to improve and/or restore self-care/home management as related to dressing, bathing and grooming.  Required minimal verbal cueing to facilitate activities of daily living skills and compensatory activities      Date: 6/16/22 (visit 11) 6/20/22 (visit 12)  6/23/22 (visit 13)  6/24/22 (visit 14) 6/27/22 (visit 15)    Modalities:  10 min  10 min 10 min  10 min    Ice  10 min to anterior hip 10 min to anterior hip 10 min to anterior hip 10 min to anterior hip                   Therapeutic Exercise: 40 min 45 min  45 min  45 min 45 min                 Clamshell, reverse clamshell, bridge 3x15 each   Bike 6 min 5 min  5 min  5 min  5 min    Stretching Therapist assisted cornell hall at side of table 7a69zyx  Prone hip IR stretch 2a03slf therapist assisted  Prone quad stretch 8e78izt therapist assisted Bent knee fall outs 20x Bent knee fall outs 20x, prone hip IR stretch 8g87ftk, prone quad stretch 5l66feb CLAUDIA stretch 5b42axl; MWM into hip flexion with belt 30x CLAUDIA stretch 4s02bwz, MWM into hip flexion with belt 30x; prone hip IR 0p65duq   Active warm up glute bridge 10x, bridge with kick out 2x10 with 5 sec hold  sidelying reverse clamshell 3x15  sidelying hip abduction 3x15 Clamshell 4x10  Reverse clamshell 3x10 Clamshell 3x15  Reverse clamshell 3x15  Bridge with kick out 50p64cfx B   Bird dogs 61u75giv Clamshell, reverse clamshell 3x15 Walking knee hug, lunge x 1 lap each, lateral band walk blue x 2 laps, monster walk blue x2 laps   Prone hip extension        Incline board Calf raise 3x15 Calf raise 3x20      Step ups Forward 3x15  Lateral 3x15   SL squat from 10\" step 4x8    Squat - Goblet 15# 4x10      Reverse hyperextensions At edge of table 3x10       Lateral band walk  Green loop 3x10; band kick backs 3x10 Green loop 3 laps x15 steps; band kick backs 3x10 Blue loop 3 laps x10 steps lateral, monster x 3 laps See above   HS bridge  With heels in chair 40x SB hamstring curls 4x8 SB hamstring curls 3x12 SB hamstring curls 4x10   Planks    Side: 6r46zax to R side, 6d76gjg to L side 3 way 4q13mol each   Leg press     120# x10, 160# x10, 180# x10 B  80# 3x8 U    Partial reverse lunges 2x10 Reverse lunges to 2 airex 4x8 Reverse lunge with slider 4x8 Reverse lunge with slider 4x8 Reverse lunge with slider 15# 4x8   Proprioceptive Activities:                                Manual Therapy:                        Functional Activities:                                            Treatment/Session Summary:    · Treatment Assessment:     fatigues with prolonged holds during plank exercises. Initiated leg press to 80 ° of hip flexion with good tolerance.    · Communication/Consultation:  None today  · Equipment provided today:  None  · Recommendations/Intent for next treatment session: Next visit will focus on progression of strength and return to prior level of function.     Total Treatment Billable Duration:   55 minutes  Time In: 0281  Time Out: 2600 [place] Session Pain  Charge Capture  [employer] Portal  MD Guidelines  Scanned Media  Benefits  MyChart

## 2022-06-29 ENCOUNTER — HOSPITAL ENCOUNTER (OUTPATIENT)
Dept: PHYSICAL THERAPY | Age: 22
Setting detail: RECURRING SERIES
Discharge: HOME | End: 2022-07-02
Payer: COMMERCIAL

## 2022-06-29 PROCEDURE — 97110 THERAPEUTIC EXERCISES: CPT

## 2022-06-29 NOTE — PROGRESS NOTES
Shaggy Solmian  : 2000  Primary: Gabbi 4752  Secondary: Alanna [phone] Kranthi Doss @ [address]  [facility] [address]  Phone: [phone]  Fax: [phone] No data recorded  No data recorded    PT Visit Info: Total # of Visits to Date: 11          L hip labral repair DOS 22   OUTPATIENT PHYSICAL PPSFDMO: NOTE TYPE: Treatment Note and Progress Note 2022     Appt Desk   Episode      Treatment Diagnosis:  Pain in left hip (M25.552)  Stiffness of Left Hip, Not elsewhere classified (J12.107)  Medical/Referring Diagnosis:  Tear of left acetabular labrum, initial encounter [J84.022Q]  Referring Physician:  Kenyatta Vargas MD MD Orders:  PT Eval and Treat   Date of Onset:  No data recorded  Date of surgery: 22  Allergies:  Patient has no allergy information on record. see initial evaluation   Restrictions/Precautions:    No data recordedNo data recorded  per physician protocol included in paper chart  Interventions Planned (Treatment may consist of any combination of the following):    No data recorded  see initial evaluation   Subjective Comments:    no new complaints. Initial:  0    /10 Post Session:  0    /10  Medications Last Reviewed:  2022  Updated Objective Findings:      Short-Term Functional Goals: Time Frame: 6 weeks  1. Patient will be independent with HEP. MET  2. Patient will demonstrate sufficient healing to progress to WBAT. MET  3. Patient will demonstrate symmetric step length with gait to improve stability during community mobility. IMPROVING  Discharge Goals: Time Frame: 16 weeks ONGOING  1. Patient will demonstrate symmetric single leg hop and stick to restore dynamic stability for return to prior level of function. 2. Patient will demonstrate >90% symmetric on isokinetic knee extension and flexion testing to normalize strength for return to prior level of function.    3. Patient will demonstrate 5/5 prone quad stretch 3g87zcu CLAUDIA stretch 4a52xvg; MWM into hip flexion with belt 30x CLAUDIA stretch 1t02qcn, MWM into hip flexion with belt 30x; prone hip IR 3u23ubn repeat    Active warm up Clamshell 3x15  Reverse clamshell 3x15  Bridge with kick out 37a20yfq B   Bird dogs 55t30aes Clamshell, reverse clamshell 3x15 Walking knee hug, lunge x 1 lap each, lateral band walk blue x 2 laps, monster walk blue x2 laps Repeat all    Prone hip extension        Incline board        Step ups  SL squat from 10\" step 4x8      Squat        Reverse hyperextensions        Lateral band walk Green loop 3 laps x15 steps; band kick backs 3x10 Blue loop 3 laps x10 steps lateral, monster x 3 laps See above     HS bridge SB hamstring curls 4x8 SB hamstring curls 3x12 SB hamstring curls 4x10 With roller 4x10    Planks  Side: 6x08vqx to R side, 8j07qae to L side 3 way 2o51jqn each 4s30tkh 3 way    Leg press   120# x10, 160# x10, 180# x10 B  80# 3x8 U SL squat to 70 ° of hip flexion 4x8     Reverse lunge with slider 4x8 Reverse lunge with slider 4x8 Reverse lunge with slider 15# 4x8 RFESS 4x8 to 2 airex    Proprioceptive Activities:                                Manual Therapy:                        Functional Activities:                                            Treatment/Session Summary:    · Treatment Assessment:     Patient performs all exercises with good technique. · Communication/Consultation:  None today  · Equipment provided today:  None  · Recommendations/Intent for next treatment session: Next visit will focus on progression of strength and return to prior level of function.     Total Treatment Billable Duration:   55 minutes  Time In: 3193  Time Out: 400 [place] Session Pain  Charge Capture  [employer] Portal  MD Guidelines  Scanned Media  Benefits  MyChart

## 2022-07-01 ENCOUNTER — HOSPITAL ENCOUNTER (OUTPATIENT)
Dept: PHYSICAL THERAPY | Age: 22
Setting detail: RECURRING SERIES
Discharge: HOME | End: 2022-07-04
Payer: COMMERCIAL

## 2022-07-01 PROCEDURE — 97110 THERAPEUTIC EXERCISES: CPT

## 2022-07-01 NOTE — PROGRESS NOTES
Valentina Palacios  : 2000  Primary: Gabbi 4752  Secondary: Alanna [phone] Kranthi Doss @ [address]  [facility] [address]  Phone: [phone]  Fax: [phone] No data recorded  No data recorded    PT Visit Info: Total # of Visits to Date: 11          L hip labral repair DOS 22   OUTPATIENT PHYSICAL MDYIORV:USMAN NOTE TYPE: Treatment Note 2022     Appt Desk   Episode      Treatment Diagnosis:  Pain in left hip (M25.552)  Stiffness of Left Hip, Not elsewhere classified (C03.992)  Medical/Referring Diagnosis:  Tear of left acetabular labrum, initial encounter [D70.567R]  Referring Physician:  Taco Morton MD MD Orders:  PT Eval and Treat   Date of Onset:  No data recorded  Date of surgery: 22  Allergies:  Patient has no allergy information on record. see initial evaluation   Restrictions/Precautions:    No data recordedNo data recorded  per physician protocol included in paper chart  Interventions Planned (Treatment may consist of any combination of the following):    No data recorded  see initial evaluation   Subjective Comments:    notes the hip has been doing good. Denies any muscle soreness. Initial:  0    /10 Post Session:  0    /10  Medications Last Reviewed:  2022  Updated Objective Findings:      Short-Term Functional Goals: Time Frame: 6 weeks  1. Patient will be independent with HEP. MET  2. Patient will demonstrate sufficient healing to progress to WBAT. MET  3. Patient will demonstrate symmetric step length with gait to improve stability during community mobility. IMPROVING  Discharge Goals: Time Frame: 16 weeks ONGOING  1. Patient will demonstrate symmetric single leg hop and stick to restore dynamic stability for return to prior level of function. 2. Patient will demonstrate >90% symmetric on isokinetic knee extension and flexion testing to normalize strength for return to prior level of function.    3. Patient will demonstrate 5/5 strength with single leg squat to restore limb strength for return to prior level of function. 6/9/22:   Hip flexion: 90 ° with normal end feel  Hip extension: 15 °   Hip IR: 30 °   Hip abduction: 4/5   Hip extension: 4/5       Treatment   TREATMENT:   THERAPEUTIC ACTIVITY: ( see below for minutes): Therapeutic activities per grid below to improve mobility, strength, balance and coordination. Required minimal visual, verbal, manual and tactile cues to improve independence and safety with daily activities . THERAPEUTIC EXERCISE: (see below for minutes): Exercises per grid below to improve mobility, strength, balance and coordination. Required minimal verbal and manual cues to promote proper body alignment, promote proper body posture and promote proper body mechanics. Progressed resistance, range, repetitions and complexity of movement as indicated. MANUAL THERAPY: (see below for minutes): Joint mobilization and Soft tissue mobilization was utilized and necessary because of the patient's restricted joint motion, painful spasm, loss of articular motion and restricted motion of soft tissue. MODALITIES: (see below for minutes): to decrease pain   SELF CARE: (see below for minutes): Procedure(s) (per grid) utilized to improve and/or restore self-care/home management as related to dressing, bathing and grooming.  Required minimal verbal cueing to facilitate activities of daily living skills and compensatory activities      Date: 6/23/22 (visit 13)  6/24/22 (visit 14) 6/27/22 (visit 15)  6/29/22 (visit 16)  7/1/22 (visit 17)   Modalities: 10 min 10 min  10 min  10 min  10 min    Ice 10 min to anterior hip 10 min to anterior hip 10 min to anterior hip 10 min to anterior hip repeat                   Therapeutic Exercise: 45 min  45 min 45 min  45 min  45 min               Clamshell, reverse clamshell, bridge 3x15 each SL bridge 3x20; clamshell 3x15 repeat   Bike 5 min  5 min  5 min  5 min  5 min Stretching Bent knee fall outs 20x, prone hip IR stretch 1f52ewk, prone quad stretch 0q21fun CLAUDIA stretch 2i74qza; MWM into hip flexion with belt 30x CLAUDIA stretch 0t02zxt, MWM into hip flexion with belt 30x; prone hip IR 0b73pmy repeat CLAUDIA stretch 1h05nut, prone hip IR stretch 0e29zor   Active warm up Clamshell 3x15  Reverse clamshell 3x15  Bridge with kick out 67h03afo B   Bird dogs 96b99xvh Clamshell, reverse clamshell 3x15 Walking knee hug, lunge x 1 lap each, lateral band walk blue x 2 laps, monster walk blue x2 laps Repeat all Repeat all   Prone hip extension        Incline board        Step ups  SL squat from 10\" step 4x8      Squat        Reverse hyperextensions        Lateral band walk Green loop 3 laps x15 steps; band kick backs 3x10 Blue loop 3 laps x10 steps lateral, monster x 3 laps See above     HS bridge SB hamstring curls 4x8 SB hamstring curls 3x12 SB hamstring curls 4x10 With roller 4x10    Planks  Side: 1s42yww to R side, 5g20ipr to L side 3 way 4v71flv each 6g11czg 3 way 4p24crq 3 way   Leg press   120# x10, 160# x10, 180# x10 B  80# 3x8 U SL squat to 70 ° of hip flexion 4x8 Repeat; leg press 180# 3x8 B; 80# 3x8 U    Reverse lunge with slider 4x8 Reverse lunge with slider 4x8 Reverse lunge with slider 15# 4x8 RFESS 4x8 to 2 airex repeat   Proprioceptive Activities:                                Manual Therapy:                        Functional Activities:                                            Treatment/Session Summary:    · Treatment Assessment:    continuing to progress with endurance training for trunk stabilization. Pt will be out of town over the next week and will resume POC on his return. · Communication/Consultation:  None today  · Equipment provided today:  None  · Recommendations/Intent for next treatment session: Next visit will focus on progression of strength and return to prior level of function.     Total Treatment Billable Duration:   55 minutes  Time In: 1027  Time Out: 2600 [place] Session Pain  Charge Capture  [employer] Portal  MD Guidelines  Scanned Media  Benefits  MyChart

## 2022-07-05 ENCOUNTER — HOSPITAL ENCOUNTER (OUTPATIENT)
Dept: PHYSICAL THERAPY | Age: 22
Setting detail: RECURRING SERIES
End: 2022-07-05
Payer: COMMERCIAL

## 2022-07-05 NOTE — PROGRESS NOTES
Patient cancelled today's appointment due to out of town travel. Will resume POC next week. **ATTENDING ADDENDUM (Dr. Mustapha Youssef): pelvic examination performed by CHEPE Solano

## 2022-07-07 ENCOUNTER — APPOINTMENT (OUTPATIENT)
Dept: PHYSICAL THERAPY | Age: 22
End: 2022-07-07
Payer: COMMERCIAL

## 2022-07-08 ENCOUNTER — APPOINTMENT (OUTPATIENT)
Dept: PHYSICAL THERAPY | Age: 22
End: 2022-07-08
Payer: COMMERCIAL

## 2022-07-11 ENCOUNTER — HOSPITAL ENCOUNTER (OUTPATIENT)
Dept: PHYSICAL THERAPY | Age: 22
Setting detail: RECURRING SERIES
Discharge: HOME | End: 2022-07-14
Payer: COMMERCIAL

## 2022-07-11 PROCEDURE — 97110 THERAPEUTIC EXERCISES: CPT

## 2022-07-11 NOTE — PROGRESS NOTES
Sonia Nurse  : 2000  Primary: Gabbi 4752  Secondary: Alanna [phone] Kranthi Doss @ [address]  [facility] [address]  Phone: [phone]  Fax: [phone] No data recorded  No data recorded    PT Visit Info: Total # of Visits to Date: 11          L hip labral repair DOS 22   OUTPATIENT PHYSICAL NPAFFDW:II NOTE TYPE: Treatment Note and Progress Note 2022     Appt Desk   Episode      Treatment Diagnosis:  Pain in left hip (M25.552)  Stiffness of Left Hip, Not elsewhere classified (I50.110)  Medical/Referring Diagnosis:  Tear of left acetabular labrum, initial encounter [G39.669Z]  Referring Physician:  Domenica Mayberry MD MD Orders:  PT Eval and Treat   Date of Onset:  No data recorded  Date of surgery: 22  Allergies:  Patient has no allergy information on record. see initial evaluation   Restrictions/Precautions:    No data recordedNo data recorded  per physician protocol included in paper chart  Interventions Planned (Treatment may consist of any combination of the following):    No data recorded  see initial evaluation   Subjective Comments:    notes the hip has been doing good. Denies any muscle soreness. Initial:  0    /10 Post Session:  0    /10  Medications Last Reviewed:  2022  Updated Objective Findings:      Short-Term Functional Goals: Time Frame: 6 weeks  1. Patient will be independent with HEP. MET  2. Patient will demonstrate sufficient healing to progress to WBAT. MET  3. Patient will demonstrate symmetric step length with gait to improve stability during community mobility. IMPROVING  Discharge Goals: Time Frame: 16 weeks ONGOING  1. Patient will demonstrate symmetric single leg hop and stick to restore dynamic stability for return to prior level of function.    2. Patient will demonstrate >90% symmetric on isokinetic knee extension and flexion testing to normalize strength for return to prior level of function. 3. Patient will demonstrate 5/5 strength with single leg squat to restore limb strength for return to prior level of function. 6/9/22:   Hip flexion: 90 ° with normal end feel  Hip extension: 15 °   Hip IR: 30 °   Hip abduction: 4/5   Hip extension: 4/5     7/11/22   Hip flexion: 120 ° with mild pinch on overpressure  Hip extension: 20 °   Hip abduction: 4+/5   Hip adduction: maintains copenhagen plank for 10sec  Single leg squat: 4/5       Treatment   TREATMENT:   THERAPEUTIC ACTIVITY: ( see below for minutes): Therapeutic activities per grid below to improve mobility, strength, balance and coordination. Required minimal visual, verbal, manual and tactile cues to improve independence and safety with daily activities . THERAPEUTIC EXERCISE: (see below for minutes): Exercises per grid below to improve mobility, strength, balance and coordination. Required minimal verbal and manual cues to promote proper body alignment, promote proper body posture and promote proper body mechanics. Progressed resistance, range, repetitions and complexity of movement as indicated. MANUAL THERAPY: (see below for minutes): Joint mobilization and Soft tissue mobilization was utilized and necessary because of the patient's restricted joint motion, painful spasm, loss of articular motion and restricted motion of soft tissue. MODALITIES: (see below for minutes): to decrease pain   SELF CARE: (see below for minutes): Procedure(s) (per grid) utilized to improve and/or restore self-care/home management as related to dressing, bathing and grooming.  Required minimal verbal cueing to facilitate activities of daily living skills and compensatory activities      Date: 6/27/22 (visit 15)  6/29/22 (visit 16)  7/1/22 (visit 17) 7/12/22 (visit 18)   Modalities: 10 min  10 min  10 min      Ice 10 min to anterior hip 10 min to anterior hip repeat                  Therapeutic Exercise: 45 min  45 min  45 min  45 min            Netta reverse clamshell, bridge 3x15 each SL bridge 3x20; clamshell 3x15 repeat    Bike 5 min  5 min  5 min  5 min    Stretching CLAUDIA stretch 7a75sqr, MWM into hip flexion with belt 30x; prone hip IR 7e84lkv repeat CLAUDIA stretch 2t49ksg, prone hip IR stretch 7n15vkm CLAUDIA stretch 6g18bak; prone hip IR stretch 6v60teu   Active warm up Walking knee hug, lunge x 1 lap each, lateral band walk blue x 2 laps, monster walk blue x2 laps Repeat all Repeat all Knee hug, lunge, lateral lunge x 1 lap each   Prone hip extension       Incline board       Step ups    Single leg squat from 20\" plyo 4x6   Squat    TBDL with 50# on each side 4x6   Reverse hyperextensions       Lateral band walk See above      HS bridge SB hamstring curls 4x10 With roller 4x10  SL with heel on box 40x   Planks 3 way 3q24lqo each 3p01nyu 3 way 8j50cry 3 way    Leg press 120# x10, 160# x10, 180# x10 B  80# 3x8 U SL squat to 70 ° of hip flexion 4x8 Repeat; leg press 180# 3x8 B; 80# 3x8 U     Reverse lunge with slider 15# 4x8 RFESS 4x8 to 2 airex repeat RDL with 50# on each side 4x6   Proprioceptive Activities:                            Manual Therapy:                     Functional Activities:                                       Treatment/Session Summary:    · Treatment Assessment:    With single leg squatting he descends to approximately 70 ° of hip flexion. Has difficulty with descending further due to continued weakness. · Communication/Consultation:  None today  · Equipment provided today:  None  · Recommendations/Intent for next treatment session: Next visit will focus on progression of strength and return to prior level of function.     Total Treatment Billable Duration:   55 minutes  Time In: 3067  Time Out: 10 [facility]. Portal  MD Guidelines  Scanned Media  Benefits  [employer]

## 2022-07-13 ENCOUNTER — HOSPITAL ENCOUNTER (OUTPATIENT)
Dept: PHYSICAL THERAPY | Age: 22
Setting detail: RECURRING SERIES
Discharge: HOME | End: 2022-07-16
Payer: COMMERCIAL

## 2022-07-13 PROCEDURE — 97110 THERAPEUTIC EXERCISES: CPT

## 2022-07-13 NOTE — PROGRESS NOTES
Gina Velez  : 2000  Primary: Gabbi 4752  Secondary: Alanna [phone] Kranthi Doss @ [address]  [facility] [place] [address]  Phone: [phone]  Fax: [phone] No data recorded  No data recorded    PT Visit Info: Total # of Visits to Date: 11          L hip labral repair DOS 22   OUTPATIENT PHYSICAL SOMLSVE:SHARITA NOTE TYPE: Treatment Note 2022     Appt Desk   Episode      Treatment Diagnosis:  Pain in left hip (M25.552)  Stiffness of Left Hip, Not elsewhere classified (Y75.696)  Medical/Referring Diagnosis:  Tear of left acetabular labrum, initial encounter [K56.334Q]  Referring Physician:  Stefan Luna MD MD Orders:  PT Eval and Treat   Date of Onset:  No data recorded  Date of surgery: 22  Allergies:  Patient has no allergy information on record. see initial evaluation   Restrictions/Precautions:    No data recordedNo data recorded  per physician protocol included in paper chart  Interventions Planned (Treatment may consist of any combination of the following):    No data recorded  see initial evaluation   Subjective Comments:    some mild hamstring soreness from previous session, but denies any hip discomfort. Initial:  0    /10 Post Session:  0    /10  Medications Last Reviewed:  2022  Updated Objective Findings:      Short-Term Functional Goals: Time Frame: 6 weeks  1. Patient will be independent with HEP. MET  2. Patient will demonstrate sufficient healing to progress to WBAT. MET  3. Patient will demonstrate symmetric step length with gait to improve stability during community mobility. IMPROVING  Discharge Goals: Time Frame: 16 weeks ONGOING  1. Patient will demonstrate symmetric single leg hop and stick to restore dynamic stability for return to prior level of function.    2. Patient will demonstrate >90% symmetric on isokinetic knee extension and flexion testing to normalize strength for return to prior level of function. 3. Patient will demonstrate 5/5 strength with single leg squat to restore limb strength for return to prior level of function. 6/9/22:   Hip flexion: 90 ° with normal end feel  Hip extension: 15 °   Hip IR: 30 °   Hip abduction: 4/5   Hip extension: 4/5     7/11/22   Hip flexion: 120 ° with mild pinch on overpressure  Hip extension: 20 °   Hip abduction: 4+/5   Hip adduction: maintains copenhagen plank for 10sec  Single leg squat: 4/5       Treatment   TREATMENT:   THERAPEUTIC ACTIVITY: ( see below for minutes): Therapeutic activities per grid below to improve mobility, strength, balance and coordination. Required minimal visual, verbal, manual and tactile cues to improve independence and safety with daily activities . THERAPEUTIC EXERCISE: (see below for minutes): Exercises per grid below to improve mobility, strength, balance and coordination. Required minimal verbal and manual cues to promote proper body alignment, promote proper body posture and promote proper body mechanics. Progressed resistance, range, repetitions and complexity of movement as indicated. MANUAL THERAPY: (see below for minutes): Joint mobilization and Soft tissue mobilization was utilized and necessary because of the patient's restricted joint motion, painful spasm, loss of articular motion and restricted motion of soft tissue. MODALITIES: (see below for minutes): to decrease pain   SELF CARE: (see below for minutes): Procedure(s) (per grid) utilized to improve and/or restore self-care/home management as related to dressing, bathing and grooming.  Required minimal verbal cueing to facilitate activities of daily living skills and compensatory activities      Date: 6/27/22 (visit 15)  6/29/22 (visit 16)  7/1/22 (visit 17) 7/11/22 (visit 18) 7/13/22 (visit 19)   Modalities: 10 min  10 min  10 min       Ice 10 min to anterior hip 10 min to anterior hip repeat                     Therapeutic Exercise: 45 min  45 min  45 min  45 min  45 min             Clamshell, reverse clamshell, bridge 3x15 each SL bridge 3x20; clamshell 3x15 repeat  Bridge 10x; with kick out [id number]; clamshell 2x20    Bike 5 min  5 min  5 min  5 min  5 min    Stretching CLAUDIA stretch 6u61fjt, MWM into hip flexion with belt 30x; prone hip IR 9r69fyn repeat CLAUDIA stretch 3j75fzb, prone hip IR stretch 4f21cci CLAUDIA stretch 5e10ull; prone hip IR stretch 1a04fis CLAUDIA stretch 7v70bmn; prone hip IR stretch 1g41clb   Active warm up Walking knee hug, lunge x 1 lap each, lateral band walk blue x 2 laps, monster walk blue x2 laps Repeat all Repeat all Knee hug, lunge, lateral lunge x 1 lap each Knee hug, lunge, lateral lunge x 1 lap each   Prone hip extension        Incline board        Step ups    Single leg squat from 20\" plyo 4x6 Single leg squat from 20\" plyo 4x6 B    Squat    TBDL with 50# on each side 4x6 TBDL with 50# on each side 4x6 staggered stance for final 3 sets   Reverse hyperextensions        Lateral band walk See above       HS bridge SB hamstring curls 4x10 With roller 4x10  SL with heel on box 40x    Planks 3 way 4i11oqh each 0e33xrh 3 way 4a36kin 3 way     Leg press 120# x10, 160# x10, 180# x10 B  80# 3x8 U SL squat to 70 ° of hip flexion 4x8 Repeat; leg press 180# 3x8 B; 80# 3x8 U      Reverse lunge with slider 15# 4x8 RFESS 4x8 to 2 airex repeat RDL with 50# on each side 4x6 RFESS 25# 3x8; RDL with 60# on each side 4x6   Proprioceptive Activities:                                Manual Therapy:                        Functional Activities:                                            Treatment/Session Summary:    · Treatment Assessment:     with single leg bridge he has difficulty maintaining full hip extension due to decreased endurance of the glute musculature.    · Communication/Consultation:  None today  · Equipment provided today:  None  · Recommendations/Intent for next treatment session: Next visit will focus on progression of strength and return to prior level of function.     Total Treatment Billable Duration:   45 minutes  Time In: 9311  Time Out: [address] Portal  MD Guidelines  Scanned Media  Benefits  MyChart

## 2022-07-15 ENCOUNTER — HOSPITAL ENCOUNTER (OUTPATIENT)
Dept: PHYSICAL THERAPY | Age: 22
Setting detail: RECURRING SERIES
Discharge: HOME | End: 2022-07-18
Payer: COMMERCIAL

## 2022-07-15 PROCEDURE — 97110 THERAPEUTIC EXERCISES: CPT

## 2022-07-15 NOTE — PROGRESS NOTES
Marlon Wills  : 2000  Primary: Gabbi 4752  Secondary: Alanna [phone] Kranthi Doss @ [address]  [facility] [address]  Phone: [phone]  Fax: [phone] No data recorded  No data recorded    PT Visit Info: Total # of Visits to Date: 11          L hip labral repair DOS 22   OUTPATIENT PHYSICAL RFAWRCS:UY NOTE TYPE: Treatment Note 7/15/2022     Appt Desk   Episode      Treatment Diagnosis:  Pain in left hip (M25.552)  Stiffness of Left Hip, Not elsewhere classified (M38.462)  Medical/Referring Diagnosis:  Tear of left acetabular labrum, initial encounter [M37.967J]  Referring Physician:  Kathy Fuentes MD MD Orders:  PT Eval and Treat   Date of Onset:  No data recorded  Date of surgery: 22  Allergies:  Patient has no allergy information on record. see initial evaluation   Restrictions/Precautions:    No data recordedNo data recorded  per physician protocol included in paper chart  Interventions Planned (Treatment may consist of any combination of the following):    No data recorded  see initial evaluation   Subjective Comments:    No new complaints. Initial:  0    /10 Post Session:  0    /10  Medications Last Reviewed:  7/15/2022  Updated Objective Findings:      Short-Term Functional Goals: Time Frame: 6 weeks  1. Patient will be independent with HEP. MET  2. Patient will demonstrate sufficient healing to progress to WBAT. MET  3. Patient will demonstrate symmetric step length with gait to improve stability during community mobility. IMPROVING  Discharge Goals: Time Frame: 16 weeks ONGOING  1. Patient will demonstrate symmetric single leg hop and stick to restore dynamic stability for return to prior level of function. 2. Patient will demonstrate >90% symmetric on isokinetic knee extension and flexion testing to normalize strength for return to prior level of function.    3. Patient will demonstrate 5/5 strength with single leg squat to restore limb strength for return to prior level of function. 6/9/22:   Hip flexion: 90 ° with normal end feel  Hip extension: 15 °   Hip IR: 30 °   Hip abduction: 4/5   Hip extension: 4/5     7/11/22   Hip flexion: 120 ° with mild pinch on overpressure  Hip extension: 20 °   Hip abduction: 4+/5   Hip adduction: maintains copenhagen plank for 10sec  Single leg squat: 4/5       Treatment   TREATMENT:   THERAPEUTIC ACTIVITY: ( see below for minutes): Therapeutic activities per grid below to improve mobility, strength, balance and coordination. Required minimal visual, verbal, manual and tactile cues to improve independence and safety with daily activities . THERAPEUTIC EXERCISE: (see below for minutes): Exercises per grid below to improve mobility, strength, balance and coordination. Required minimal verbal and manual cues to promote proper body alignment, promote proper body posture and promote proper body mechanics. Progressed resistance, range, repetitions and complexity of movement as indicated. MANUAL THERAPY: (see below for minutes): Joint mobilization and Soft tissue mobilization was utilized and necessary because of the patient's restricted joint motion, painful spasm, loss of articular motion and restricted motion of soft tissue. MODALITIES: (see below for minutes): to decrease pain   SELF CARE: (see below for minutes): Procedure(s) (per grid) utilized to improve and/or restore self-care/home management as related to dressing, bathing and grooming.  Required minimal verbal cueing to facilitate activities of daily living skills and compensatory activities      Date: 7/1/22 (visit 17) 7/11/22 (visit 18) PN 7/13/22 (visit 19) 7/15/22 (visit 20)    Modalities: 10 min        Ice repeat                    Therapeutic Exercise: 45 min  45 min  45 min  45 min            repeat  Bridge 10x; with kick out [id number]; clamshell 2x20     Bike 5 min  5 min  5 min  5 min    Stretching CLAUDIA stretch 3l88mmf, prone hip IR stretch 5e49jrx CLAUDIA stretch 9a51wng; prone hip IR stretch 6n24hnn CLAUDIA stretch 1h45yfi; prone hip IR stretch 3f11yyi repeat   Active warm up Repeat all Knee hug, lunge, lateral lunge x 1 lap each Knee hug, lunge, lateral lunge x 1 lap each Knee hug, quad stretch, lunge x 1 lap; lateral band walk blue x 2 laps   Prone hip extension       Incline board       Step ups  Single leg squat from 20\" plyo 4x6 Single leg squat from 20\" plyo 4x6 B  SL squat from 20\" plyo 3x8 B   Squat  TBDL with 50# on each side 4x6 TBDL with 50# on each side 4x6 staggered stance for final 3 sets TBDL with 70# on each side 4x6 staggered stance done B (RPE 4/10, increase reps next visit)   Reverse hyperextensions    Nordic hamstring curls 3x6   Lateral band walk       HS bridge  SL with heel on box 40x     Planks 6t35hzb 3 way      Leg press Repeat; leg press 180# 3x8 B; 80# 3x8 U       repeat RDL with 50# on each side 4x6 RFESS 25# 3x8; RDL with 60# on each side 4x6 RDL with 60# on each side 4x6 (RPE 5x10, increase reps next visit)    Proprioceptive Activities:                            Manual Therapy:                     Functional Activities:                                       Treatment/Session Summary:    Treatment Assessment:    Patient performs all exercises with good technique. Communication/Consultation:  None today  Equipment provided today:  None  Recommendations/Intent for next treatment session: Next visit will focus on progression of strength and return to prior level of function.     Total Treatment Billable Duration:   45 minutes  Time In: 1999  Time Out: 10 [place] [facility]. Portal  MD Guidelines  Scanned Media  Benefits  MyChart

## 2022-07-18 ENCOUNTER — HOSPITAL ENCOUNTER (OUTPATIENT)
Dept: PHYSICAL THERAPY | Age: 22
Setting detail: RECURRING SERIES
Discharge: HOME | End: 2022-07-21
Payer: COMMERCIAL

## 2022-07-18 PROCEDURE — 97110 THERAPEUTIC EXERCISES: CPT

## 2022-07-18 NOTE — PROGRESS NOTES
Rolando Davila  : 2000  Primary: Gabbi 4752  Secondary: Cadencetrarian [phone] Kranthi Doss @ [address]  [facility] [address]  Phone: [phone]  Fax: [phone] No data recorded  No data recorded    PT Visit Info: Total # of Visits to Date: 11          L hip labral repair DOS 22   OUTPATIENT PHYSICAL GSJRMVR:BL NOTE TYPE: Treatment Note 2022     Appt Desk   Episode      Treatment Diagnosis:  Pain in left hip (M25.552)  Stiffness of Left Hip, Not elsewhere classified (P51.564)  Medical/Referring Diagnosis:  Tear of left acetabular labrum, initial encounter [D52.300G]  Referring Physician:  Otoniel Hadley MD MD Orders:  PT Eval and Treat   Date of Onset:  No data recorded  Date of surgery: 22  Allergies:  Patient has no allergy information on record. see initial evaluation   Restrictions/Precautions:    No data recordedNo data recorded  per physician protocol included in paper chart  Interventions Planned (Treatment may consist of any combination of the following):    No data recorded  see initial evaluation   Subjective Comments:   Continues to report feeling good. Initial:  0    /10 Post Session:  0    /10  Medications Last Reviewed:  2022  Updated Objective Findings:      Short-Term Functional Goals: Time Frame: 6 weeks  1. Patient will be independent with HEP. MET  2. Patient will demonstrate sufficient healing to progress to WBAT. MET  3. Patient will demonstrate symmetric step length with gait to improve stability during community mobility. IMPROVING  Discharge Goals: Time Frame: 16 weeks ONGOING  1. Patient will demonstrate symmetric single leg hop and stick to restore dynamic stability for return to prior level of function. 2. Patient will demonstrate >90% symmetric on isokinetic knee extension and flexion testing to normalize strength for return to prior level of function.    3. Patient will demonstrate 5/5 strength with single leg squat to restore limb strength for return to prior level of function. 6/9/22:   Hip flexion: 90 ° with normal end feel  Hip extension: 15 °   Hip IR: 30 °   Hip abduction: 4/5   Hip extension: 4/5     7/11/22   Hip flexion: 120 ° with mild pinch on overpressure  Hip extension: 20 °   Hip abduction: 4+/5   Hip adduction: maintains copenhagen plank for 10sec  Single leg squat: 4/5       Treatment   TREATMENT:   THERAPEUTIC ACTIVITY: ( see below for minutes): Therapeutic activities per grid below to improve mobility, strength, balance and coordination. Required minimal visual, verbal, manual and tactile cues to improve independence and safety with daily activities . THERAPEUTIC EXERCISE: (see below for minutes): Exercises per grid below to improve mobility, strength, balance and coordination. Required minimal verbal and manual cues to promote proper body alignment, promote proper body posture and promote proper body mechanics. Progressed resistance, range, repetitions and complexity of movement as indicated. MANUAL THERAPY: (see below for minutes): Joint mobilization and Soft tissue mobilization was utilized and necessary because of the patient's restricted joint motion, painful spasm, loss of articular motion and restricted motion of soft tissue. MODALITIES: (see below for minutes): to decrease pain   SELF CARE: (see below for minutes): Procedure(s) (per grid) utilized to improve and/or restore self-care/home management as related to dressing, bathing and grooming.  Required minimal verbal cueing to facilitate activities of daily living skills and compensatory activities      Date: 7/1/22 (visit 17) 7/11/22 (visit 18) PN 7/13/22 (visit 19) 7/15/22 (visit 20)  7/18/22 (visit 21)    Modalities: 10 min         Ice repeat                       Therapeutic Exercise: 45 min  45 min  45 min  45 min  45 min             repeat  Bridge 10x; with kick out [id number]; clamshell 2x20   Bridge with kick out [id number]   Bike 5 min  5 min  5 min  5 min     Stretching CLAUDIA stretch 0j84zlk, prone hip IR stretch 5o93izk CLAUDIA stretch 6s96eto; prone hip IR stretch 0b17nyg CLAUDIA stretch 7o49mym; prone hip IR stretch 5t38owb repeat repeat   Active warm up Repeat all Knee hug, lunge, lateral lunge x 1 lap each Knee hug, lunge, lateral lunge x 1 lap each Knee hug, quad stretch, lunge x 1 lap; lateral band walk blue x 2 laps repeat   Prone hip extension        Incline board        Step ups  Single leg squat from 20\" plyo 4x6 Single leg squat from 20\" plyo 4x6 B  SL squat from 20\" plyo 3x8 B    Squat  TBDL with 50# on each side 4x6 TBDL with 50# on each side 4x6 staggered stance for final 3 sets TBDL with 70# on each side 4x6 staggered stance done B (RPE 4/10, increase reps next visit) CKC LE Y balance 4x3 3 way   Reverse hyperextensions    Nordic hamstring curls 3x6 Nordic hamstring curls 3x6   Lateral band walk        HS bridge  SL with heel on box 40x      Planks 6j17ize 3 way    Anna [id number] with LLE on box    Leg press Repeat; leg press 180# 3x8 B; 80# 3x8 U        repeat RDL with 50# on each side 4x6 RFESS 25# 3x8; RDL with 60# on each side 4x6 RDL with 60# on each side 4x6 (RPE 5x10, increase reps next visit)  RDL with 60# on each side 4x6 (RPE 4-5/10) increase reps next visit   Proprioceptive Activities:                                Manual Therapy:                        Functional Activities:                                            Treatment/Session Summary:    Treatment Assessment:    With copenhagen plank he demonstrates improved saggital plane trunk control. Communication/Consultation:  None today  Equipment provided today:  None  Recommendations/Intent for next treatment session: Next visit will focus on progression of strength and return to prior level of function.     Total Treatment Billable Duration:   45 minutes  Time In: 8934  Time Out: [address] [facility] Session Pain  Charge Capture  [employer] Portal  MD Guidelines  Scanned Media  Benefits  IMRSVt

## 2022-07-20 ENCOUNTER — HOSPITAL ENCOUNTER (OUTPATIENT)
Dept: PHYSICAL THERAPY | Age: 22
Setting detail: RECURRING SERIES
Discharge: HOME | End: 2022-07-23
Payer: COMMERCIAL

## 2022-07-20 PROCEDURE — 97110 THERAPEUTIC EXERCISES: CPT

## 2022-07-20 NOTE — PROGRESS NOTES
Bairon Cortes  : 2000  Primary: Gabbi 4752  Secondary: [phone] Kranthi Doss @ [address]  [facility] [address]  Phone: [phone]  Fax: [phone] No data recorded  No data recorded    PT Visit Info: Total # of Visits to Date: 11          L hip labral repair DOS 22   OUTPATIENT PHYSICAL CDIAOHL:GK NOTE TYPE: Treatment Note 2022     Appt Desk   Episode      Treatment Diagnosis:  Pain in left hip (M25.552)  Stiffness of Left Hip, Not elsewhere classified (I76.593)  Medical/Referring Diagnosis:  Tear of left acetabular labrum, initial encounter [J01.491V]  Referring Physician:  Chitra Duarte MD MD Orders:  PT Eval and Treat   Date of Onset:  No data recorded  Date of surgery: 22  Allergies:  Patient has no allergy information on record. see initial evaluation   Restrictions/Precautions:    No data recordedNo data recorded  per physician protocol included in paper chart  Interventions Planned (Treatment may consist of any combination of the following):    No data recorded  see initial evaluation   Subjective Comments:   No soreness noted. Initial:  0    /10 Post Session:  0    /10  Medications Last Reviewed:  2022  Updated Objective Findings:      Short-Term Functional Goals: Time Frame: 6 weeks  1. Patient will be independent with HEP. MET  2. Patient will demonstrate sufficient healing to progress to WBAT. MET  3. Patient will demonstrate symmetric step length with gait to improve stability during community mobility. IMPROVING  Discharge Goals: Time Frame: 16 weeks ONGOING  1. Patient will demonstrate symmetric single leg hop and stick to restore dynamic stability for return to prior level of function. 2. Patient will demonstrate >90% symmetric on isokinetic knee extension and flexion testing to normalize strength for return to prior level of function.    3. Patient will demonstrate 5/5 strength with single leg squat to restore limb strength for return to prior level of function. 6/9/22:   Hip flexion: 90 ° with normal end feel  Hip extension: 15 °   Hip IR: 30 °   Hip abduction: 4/5   Hip extension: 4/5     7/11/22   Hip flexion: 120 ° with mild pinch on overpressure  Hip extension: 20 °   Hip abduction: 4+/5   Hip adduction: maintains [place] plank for 10sec  Single leg squat: 4/5       Treatment   TREATMENT:   THERAPEUTIC ACTIVITY: ( see below for minutes): Therapeutic activities per grid below to improve mobility, strength, balance and coordination. Required minimal visual, verbal, manual and tactile cues to improve independence and safety with daily activities . THERAPEUTIC EXERCISE: (see below for minutes): Exercises per grid below to improve mobility, strength, balance and coordination. Required minimal verbal and manual cues to promote proper body alignment, promote proper body posture and promote proper body mechanics. Progressed resistance, range, repetitions and complexity of movement as indicated. MANUAL THERAPY: (see below for minutes): Joint mobilization and Soft tissue mobilization was utilized and necessary because of the patient's restricted joint motion, painful spasm, loss of articular motion and restricted motion of soft tissue. MODALITIES: (see below for minutes): to decrease pain   SELF CARE: (see below for minutes): Procedure(s) (per grid) utilized to improve and/or restore self-care/home management as related to dressing, bathing and grooming. Required minimal verbal cueing to facilitate activities of daily living skills and compensatory activities      Date: 7/11/22 (visit 18) PN 7/13/22 (visit 19) 7/15/22 (visit 20)  7/18/22 (visit 21)  7/20/22 (visit 22)    Modalities:          Ice                        Therapeutic Exercise: 45 min  45 min  45 min  45 min  45 min              Bridge 10x; with kick out [id number]; clamshell 2x20   Bridge with kick out [id number] Bridge with kick out [id number] Bike 5 min  5 min  5 min      Stretching CLAUDIA stretch [id number]; prone hip IR stretch [id number] CLAUDIA stretch 3h08bwl; prone hip IR stretch [id number] repeat repeat repeat   Active warm up Knee hug, lunge, lateral lunge x 1 lap each Knee hug, lunge, lateral lunge x 1 lap each Knee hug, quad stretch, lunge x 1 lap; lateral band walk blue x 2 laps repeat repeat   Prone hip extension        Incline board        Step ups Single leg squat from 20\" plyo 4x6 Single leg squat from 20\" plyo 4x6 B  SL squat from 20\" plyo 3x8 B  SL squat from 20\" plyo 4x10 B   Squat TBDL with 50# on each side 4x6 TBDL with 50# on each side 4x6 staggered stance for final 3 sets TBDL with 70# on each side 4x6 staggered stance done B (RPE 4/10, increase reps next visit) CKC LE Y balance 4x3 3 way TBDL with 70# on each side 4x6 staggered stance   Reverse hyperextensions   Nordic hamstring curls 3x6 Nordic hamstring curls 3x6 Nordic hamstring curls 4x6   Lateral band walk        HS bridge SL with heel on box 40x       Planks    [place] [id number] with LLE on box     Leg press         RDL with 50# on each side 4x6 RFESS 25# 3x8; RDL with 60# on each side 4x6 RDL with 60# on each side 4x6 (RPE 5x10, increase reps next visit)  RDL with 60# on each side 4x6 (RPE 4-5/10) increase reps next visit RDL with 60# on each side 3x8   Proprioceptive Activities:                                Manual Therapy:                        Functional Activities:                                            Treatment/Session Summary:    Treatment Assessment:     Patient performs all exercises with good technique. Communication/Consultation:  None today  Equipment provided today:  None  Recommendations/Intent for next treatment session: Next visit will focus on progression of strength and return to prior level of function.     Total Treatment Billable Duration:   45 minutes  Time In: 7542  Time Out: 9908 [facility] Session Pain  Charge Capture 295 [facility] Portal  MD Guidelines  Scanned Media  Benefits  MyChart

## 2022-07-22 ENCOUNTER — HOSPITAL ENCOUNTER (OUTPATIENT)
Dept: PHYSICAL THERAPY | Age: 22
Setting detail: RECURRING SERIES
Discharge: HOME | End: 2022-07-25
Payer: COMMERCIAL

## 2022-07-22 PROCEDURE — 97110 THERAPEUTIC EXERCISES: CPT

## 2022-07-22 NOTE — PROGRESS NOTES
Valentina Palacios  : 2000  Primary: Gabbi 4752  Secondary: Alanna [phone] Kranthi Doss @ [address]  [place] [place] [address]  Phone: [phone]  Fax: [phone] No data recorded  No data recorded    PT Visit Info: Total # of Visits to Date: 11          L hip labral repair DOS 22   OUTPATIENT PHYSICAL KLUSPVY:WS NOTE TYPE: Treatment Note 2022     Appt Desk   Episode      Treatment Diagnosis:  Pain in left hip (M25.552)  Stiffness of Left Hip, Not elsewhere classified (M86.237)  Medical/Referring Diagnosis:  Tear of left acetabular labrum, initial encounter [C81.427P]  Referring Physician:  Taco Morton MD MD Orders:  PT Eval and Treat   Date of Onset:  No data recorded  Date of surgery: 22  Allergies:  Patient has no allergy information on record. see initial evaluation   Restrictions/Precautions:    No data recordedNo data recorded  per physician protocol included in paper chart  Interventions Planned (Treatment may consist of any combination of the following):    No data recorded  see initial evaluation   Subjective Comments:   Notes the hip has been feeling good. Initial:  0    /10 Post Session:  0    /10  Medications Last Reviewed:  2022  Updated Objective Findings:      Short-Term Functional Goals: Time Frame: 6 weeks  1. Patient will be independent with HEP. MET  2. Patient will demonstrate sufficient healing to progress to WBAT. MET  3. Patient will demonstrate symmetric step length with gait to improve stability during community mobility. IMPROVING  Discharge Goals: Time Frame: 16 weeks ONGOING  1. Patient will demonstrate symmetric single leg hop and stick to restore dynamic stability for return to prior level of function. 2. Patient will demonstrate >90% symmetric on isokinetic knee extension and flexion testing to normalize strength for return to prior level of function.    3. Patient will demonstrate 5/5 strength with single leg squat to restore limb strength for return to prior level of function. 6/9/22:   Hip flexion: 90 ° with normal end feel  Hip extension: 15 °   Hip IR: 30 °   Hip abduction: 4/5   Hip extension: 4/5     7/11/22   Hip flexion: 120 ° with mild pinch on overpressure  Hip extension: 20 °   Hip abduction: 4+/5   Hip adduction: maintains [place]enhagen plank for 10sec  Single leg squat: 4/5       Treatment   TREATMENT:   THERAPEUTIC ACTIVITY: ( see below for minutes): Therapeutic activities per grid below to improve mobility, strength, balance and coordination. Required minimal visual, verbal, manual and tactile cues to improve independence and safety with daily activities . THERAPEUTIC EXERCISE: (see below for minutes): Exercises per grid below to improve mobility, strength, balance and coordination. Required minimal verbal and manual cues to promote proper body alignment, promote proper body posture and promote proper body mechanics. Progressed resistance, range, repetitions and complexity of movement as indicated. MANUAL THERAPY: (see below for minutes): Joint mobilization and Soft tissue mobilization was utilized and necessary because of the patient's restricted joint motion, painful spasm, loss of articular motion and restricted motion of soft tissue. MODALITIES: (see below for minutes): to decrease pain   SELF CARE: (see below for minutes): Procedure(s) (per grid) utilized to improve and/or restore self-care/home management as related to dressing, bathing and grooming. Required minimal verbal cueing to facilitate activities of daily living skills and compensatory activities      Date: 7/15/22 (visit 20)  7/18/22 (visit 21)  7/20/22 (visit 22)  7/22/22 (visit 23)    Modalities:        Ice                     Therapeutic Exercise: 45 min  45 min  45 min  45 min            Bridge with kick out [id number] Bridge with kick out [id number]    Bike 5 min       Stretching repeat repeat repeat repeat Active warm up Knee hug, quad stretch, lunge x 1 lap; lateral band walk blue x 2 laps repeat repeat repeat   Prone hip extension       Incline board       Step ups SL squat from 20\" plyo 3x8 B  SL squat from 20\" plyo 4x10 B Step ups to 20\" plyo 3x8 slow   Squat TBDL with 70# on each side 4x6 staggered stance done B (RPE 4/10, increase reps next visit) CKC LE Y balance 4x3 3 way TBDL with 70# on each side 4x6 staggered stance    Reverse hyperextensions Nordic hamstring curls 3x6 Nordic hamstring curls 3x6 Nordic hamstring curls 4x6 repeat   Lateral band walk       HS bridge       Planks  [place] [id number] with LLE on box   [place] [id number] with LLE on box   Leg press        RDL with 60# on each side 4x6 (RPE 5x10, increase reps next visit)  RDL with 60# on each side 4x6 (RPE 4-5/10) increase reps next visit RDL with 60# on each side 3x8 RDL with  60# on each side 3x8    Proprioceptive Activities:                            Manual Therapy:                     Functional Activities:                                       Treatment/Session Summary:    Treatment Assessment:     During step ups he demonstrates good control or frontal plane knee position. Communication/Consultation:  None today  Equipment provided today:  None  Recommendations/Intent for next treatment session: Next visit will focus on progression of strength and return to prior level of function.     Total Treatment Billable Duration:   45 minutes  Time In: 3612  Time Out: 10 [facility]. Portal  MD Guidelines  Scanned Media  Benefits  MyChart

## 2022-07-25 ENCOUNTER — HOSPITAL ENCOUNTER (OUTPATIENT)
Dept: PHYSICAL THERAPY | Age: 22
Setting detail: RECURRING SERIES
Discharge: HOME | End: 2022-07-28
Payer: COMMERCIAL

## 2022-07-25 PROCEDURE — 97110 THERAPEUTIC EXERCISES: CPT

## 2022-07-25 NOTE — PROGRESS NOTES
Ada Alarcon  : 2000  Primary: Gabbi 4752  Secondary: Alanna [phone] Kranthi Doss @ [address]  [facility] [address]  Phone: [phone]  Fax: [phone] No data recorded  No data recorded    PT Visit Info: Total # of Visits to Date: 11          L hip labral repair DOS 22   OUTPATIENT PHYSICAL HROBJ: NOTE TYPE: Treatment Note 2022     Appt Desk   Episode      Treatment Diagnosis:  Pain in left hip (M25.552)  Stiffness of Left Hip, Not elsewhere classified (V14.607)  Medical/Referring Diagnosis:  Tear of left acetabular labrum, initial encounter [X08.502X]  Referring Physician:  Concetta Jensen MD MD Orders:  PT Eval and Treat   Date of Onset:  No data recorded  Date of surgery: 22  Allergies:  Patient has no allergy information on record. see initial evaluation   Restrictions/Precautions:    No data recordedNo data recorded  per physician protocol included in paper chart  Interventions Planned (Treatment may consist of any combination of the following):    No data recorded  see initial evaluation   Subjective Comments:   Feels like the left hip is getting more flexible. Initial:  0    /10 Post Session:  0    /10  Medications Last Reviewed:  2022  Updated Objective Findings:      Short-Term Functional Goals: Time Frame: 6 weeks  1. Patient will be independent with HEP. MET  2. Patient will demonstrate sufficient healing to progress to WBAT. MET  3. Patient will demonstrate symmetric step length with gait to improve stability during community mobility. IMPROVING  Discharge Goals: Time Frame: 16 weeks ONGOING  1. Patient will demonstrate symmetric single leg hop and stick to restore dynamic stability for return to prior level of function. 2. Patient will demonstrate >90% symmetric on isokinetic knee extension and flexion testing to normalize strength for return to prior level of function.    3. Patient will demonstrate 5/5 strength with single leg squat to restore limb strength for return to prior level of function. 6/9/22:   Hip flexion: 90 ° with normal end feel  Hip extension: 15 °   Hip IR: 30 °   Hip abduction: 4/5   Hip extension: 4/5     7/11/22   Hip flexion: 120 ° with mild pinch on overpressure  Hip extension: 20 °   Hip abduction: 4+/5   Hip adduction: maintains [place]enhagen plank for 10sec  Single leg squat: 4/5       Treatment   TREATMENT:   THERAPEUTIC ACTIVITY: ( see below for minutes): Therapeutic activities per grid below to improve mobility, strength, balance and coordination. Required minimal visual, verbal, manual and tactile cues to improve independence and safety with daily activities . THERAPEUTIC EXERCISE: (see below for minutes): Exercises per grid below to improve mobility, strength, balance and coordination. Required minimal verbal and manual cues to promote proper body alignment, promote proper body posture and promote proper body mechanics. Progressed resistance, range, repetitions and complexity of movement as indicated. MANUAL THERAPY: (see below for minutes): Joint mobilization and Soft tissue mobilization was utilized and necessary because of the patient's restricted joint motion, painful spasm, loss of articular motion and restricted motion of soft tissue. MODALITIES: (see below for minutes): to decrease pain   SELF CARE: (see below for minutes): Procedure(s) (per grid) utilized to improve and/or restore self-care/home management as related to dressing, bathing and grooming. Required minimal verbal cueing to facilitate activities of daily living skills and compensatory activities      Date: 7/15/22 (visit 20)  7/18/22 (visit 21)  7/20/22 (visit 22)  7/22/22 (visit 23)  7/25/22 (visit 24)    Modalities:         Ice                        Therapeutic Exercise: 45 min  45 min  45 min  45 min 45 min              Bridge with kick out [employer] with kick out [employer] with kick out [id number]; qped hip abduction 2x20 B; qped hip extension 2x20 B   Bike 5 min        Stretching repeat repeat repeat repeat Therapist assisted stretch into CLAUDIA, prone hip IR; prone quad [id number]; emma pose [id number]   Active warm up Knee hug, quad stretch, lunge x 1 lap; lateral band walk blue x 2 laps repeat repeat repeat Lateral band walk, monster walk blue x 2 laps each   Prone hip extension        Incline board        Step ups SL squat from 20\" plyo 3x8 B  SL squat from 20\" plyo 4x10 B Step ups to 20\" plyo 3x8 slow    Squat TBDL with 70# on each side 4x6 staggered stance done B (RPE 4/10, increase reps next visit) CKC LE Y balance 4x3 3 way TBDL with 70# on each side 4x6 staggered stance     Reverse hyperextensions Nordic hamstring curls 3x6 Nordic hamstring curls 3x6 Nordic hamstring curls 4x6 repeat Nordic hamstring curls 4x6   Lateral band walk        HS bridge        Planks  [place] [id number] with LLE on box   [place] [id number] with LLE on box    Leg press         RDL with 60# on each side 4x6 (RPE 5x10, increase reps next visit)  RDL with 60# on each side 4x6 (RPE 4-5/10) increase reps next visit RDL with 60# on each side 3x8 RDL with  60# on each side 3x8  RDL 70# on each side 4x6   Proprioceptive Activities:                                Manual Therapy:                        Functional Activities:                                            Treatment/Session Summary:    Treatment Assessment:     Fatigues with increased volume of glute activation work. Under fatigue, he demonstrates difficulty using full range of motion. Communication/Consultation:  None today  Equipment provided today:  None  Recommendations/Intent for next treatment session: Next visit will focus on progression of strength and return to prior level of function.     Total Treatment Billable Duration:   45 minutes  Time In: 1615  Time Out: 9445 [facility],[address] Session Pain  Charge Capture  [employer] Portal MD Guidelines  Scanned Media  Benefits  MyChart

## 2022-07-27 ENCOUNTER — HOSPITAL ENCOUNTER (OUTPATIENT)
Dept: PHYSICAL THERAPY | Age: 22
Setting detail: RECURRING SERIES
Discharge: HOME | End: 2022-07-30
Payer: COMMERCIAL

## 2022-07-27 PROCEDURE — 97110 THERAPEUTIC EXERCISES: CPT

## 2022-07-27 NOTE — PROGRESS NOTES
Cira Issa  : 2000  Primary: Gabbi 4752  Secondary: Alanna [phone] Kranthi Doss @ [address]  [facility] [address]  Phone: [phone]  Fax: [phone] No data recorded  No data recorded    PT Visit Info: Total # of Visits to Date: 11          L hip labral repair DOS 22   OUTPATIENT PHYSICAL OJQGZXG:GD NOTE TYPE: Treatment Note 2022     Appt Desk   Episode      Treatment Diagnosis:  Pain in left hip (M25.552)  Stiffness of Left Hip, Not elsewhere classified (A59.397)  Medical/Referring Diagnosis:  Tear of left acetabular labrum, initial encounter [S26.969Z]  Referring Physician:  Zan Brown MD MD Orders:  PT Eval and Treat   Date of Onset:  No data recorded  Date of surgery: 22  Allergies:  Patient has no allergy information on record. see initial evaluation   Restrictions/Precautions:    No data recordedNo data recorded  per physician protocol included in paper chart  Interventions Planned (Treatment may consist of any combination of the following):    No data recorded  see initial evaluation   Subjective Comments:   Denies any soreness or discomfort. Initial:  0    /10 Post Session:  0    /10  Medications Last Reviewed:  2022  Updated Objective Findings:      Short-Term Functional Goals: Time Frame: 6 weeks  1. Patient will be independent with HEP. MET  2. Patient will demonstrate sufficient healing to progress to WBAT. MET  3. Patient will demonstrate symmetric step length with gait to improve stability during community mobility. IMPROVING  Discharge Goals: Time Frame: 16 weeks ONGOING  1. Patient will demonstrate symmetric single leg hop and stick to restore dynamic stability for return to prior level of function. 2. Patient will demonstrate >90% symmetric on isokinetic knee extension and flexion testing to normalize strength for return to prior level of function.    3. Patient will demonstrate 5/5 strength with single leg squat to restore limb strength for return to prior level of function. 6/9/22:   Hip flexion: 90 ° with normal end feel  Hip extension: 15 °   Hip IR: 30 °   Hip abduction: 4/5   Hip extension: 4/5     7/11/22   Hip flexion: 120 ° with mild pinch on overpressure  Hip extension: 20 °   Hip abduction: 4+/5   Hip adduction: maintains [place]enhagen plank for 10sec  Single leg squat: 4/5       Treatment   TREATMENT:   THERAPEUTIC ACTIVITY: ( see below for minutes): Therapeutic activities per grid below to improve mobility, strength, balance and coordination. Required minimal visual, verbal, manual and tactile cues to improve independence and safety with daily activities . THERAPEUTIC EXERCISE: (see below for minutes): Exercises per grid below to improve mobility, strength, balance and coordination. Required minimal verbal and manual cues to promote proper body alignment, promote proper body posture and promote proper body mechanics. Progressed resistance, range, repetitions and complexity of movement as indicated. MANUAL THERAPY: (see below for minutes): Joint mobilization and Soft tissue mobilization was utilized and necessary because of the patient's restricted joint motion, painful spasm, loss of articular motion and restricted motion of soft tissue. MODALITIES: (see below for minutes): to decrease pain   SELF CARE: (see below for minutes): Procedure(s) (per grid) utilized to improve and/or restore self-care/home management as related to dressing, bathing and grooming. Required minimal verbal cueing to facilitate activities of daily living skills and compensatory activities      Date: 7/20/22 (visit 22)  7/22/22 (visit 23)  7/25/22 (visit 24)  7/27/22 (visit 25)    Modalities:        Ice                     Therapeutic Exercise: 45 min  45 min 45 min  45 min            Bridge with kick out [id number]  Bridge with kick out [id number]; qped hip abduction 2x20 B; qped hip extension 2x20 B Qped hip extension 20x, qped hip abduction 20x   Bike    5 min    Stretching repeat repeat Therapist assisted stretch into CLAUDIA, prone hip IR; prone quad [id number]; emma pose [id number] repeat   Active warm up repeat repeat Lateral band walk, monster walk blue x 2 laps each repeat   Prone hip extension       Incline board       Step ups SL squat from 20\" plyo 4x10 B Step ups to 20\" plyo 3x8 slow  SL squat to 20\" plyo 3x8 B through full depth    Squat TBDL with 70# on each side 4x6 staggered stance   TBDL 70# on each side 4x10 with symmetric stance   Reverse hyperextensions Nordic hamstring curls 4x6 repeat Nordic hamstring curls 4x6    Lateral band walk       HS bridge       Planks  [place] [id number] with LLE on box     Leg press        RDL with 60# on each side 3x8 RDL with  60# on each side 3x8  RDL 70# on each side 4x6 RDL 70# on each side 3x10   Proprioceptive Activities:                            Manual Therapy:                     Functional Activities:                                       Treatment/Session Summary:    Treatment Assessment:     Patient performs all exercises with good technique. Communication/Consultation:  None today  Equipment provided today:  None  Recommendations/Intent for next treatment session: Next visit will focus on progression of strength and return to prior level of function.     Total Treatment Billable Duration:   45 minutes  Time In: 0817  Time Out: 10 [facility]. Portal  MD Guidelines  Scanned Media  Benefits  MyChart

## 2022-07-29 ENCOUNTER — HOSPITAL ENCOUNTER (OUTPATIENT)
Dept: PHYSICAL THERAPY | Age: 22
Setting detail: RECURRING SERIES
Discharge: HOME | End: 2022-08-01
Payer: COMMERCIAL

## 2022-07-29 PROCEDURE — 97110 THERAPEUTIC EXERCISES: CPT

## 2022-07-29 NOTE — PROGRESS NOTES
Renell Alpers  : 2000  Primary: Gab[address]  Secondary: Angela Patino [phone] Kranthi Doss @ [address]  [facility] [address]  Phone: [phone]  Fax: [phone] No data recorded  No data recorded    PT Visit Info: Total # of Visits to Date: 11          L hip labral repair DOS 22   OUTPATIENT PHYSICAL VCNJOAQUIMJ:SHANON NOTE TYPE: Treatment Note 2022     Appt Desk   Episode      Treatment Diagnosis:  Pain in left hip (M25.552)  Stiffness of Left Hip, Not elsewhere classified (Y37.677)  Medical/Referring Diagnosis:  Tear of left acetabular labrum, initial encounter [H83.831G]  Referring Physician:  Vonda Mcgregor MD MD Orders:  PT Eval and Treat   Date of Onset:  No data recorded  Date of surgery: 22  Allergies:  Patient has no allergy information on record. see initial evaluation   Restrictions/Precautions:    No data recordedNo data recorded  per physician protocol included in paper chart  Interventions Planned (Treatment may consist of any combination of the following):    No data recorded  see initial evaluation   Subjective Comments:   Notes the hip has been feeling good. Initial:  0    /10 Post Session:  0    /10  Medications Last Reviewed:  2022  Updated Objective Findings:      Short-Term Functional Goals: Time Frame: 6 weeks  1. Patient will be independent with HEP. MET  2. Patient will demonstrate sufficient healing to progress to WBAT. MET  3. Patient will demonstrate symmetric step length with gait to improve stability during community mobility. IMPROVING  Discharge Goals: Time Frame: 16 weeks ONGOING  1. Patient will demonstrate symmetric single leg hop and stick to restore dynamic stability for return to prior level of function. 2. Patient will demonstrate >90% symmetric on isokinetic knee extension and flexion testing to normalize strength for return to prior level of function.    3. Patient will demonstrate 5/5 strength with single leg squat to restore limb strength for return to prior level of function. 6/9/22:   Hip flexion: 90 ° with normal end feel  Hip extension: 15 °   Hip IR: 30 °   Hip abduction: 4/5   Hip extension: 4/5     7/11/22   Hip flexion: 120 ° with mild pinch on overpressure  Hip extension: 20 °   Hip abduction: 4+/5   Hip adduction: maintains [place] plank for 10sec  Single leg squat: 4/5       Treatment   TREATMENT:   THERAPEUTIC ACTIVITY: ( see below for minutes): Therapeutic activities per grid below to improve mobility, strength, balance and coordination. Required minimal visual, verbal, manual and tactile cues to improve independence and safety with daily activities . THERAPEUTIC EXERCISE: (see below for minutes): Exercises per grid below to improve mobility, strength, balance and coordination. Required minimal verbal and manual cues to promote proper body alignment, promote proper body posture and promote proper body mechanics. Progressed resistance, range, repetitions and complexity of movement as indicated. MANUAL THERAPY: (see below for minutes): Joint mobilization and Soft tissue mobilization was utilized and necessary because of the patient's restricted joint motion, painful spasm, loss of articular motion and restricted motion of soft tissue. MODALITIES: (see below for minutes): to decrease pain   SELF CARE: (see below for minutes): Procedure(s) (per grid) utilized to improve and/or restore self-care/home management as related to dressing, bathing and grooming. Required minimal verbal cueing to facilitate activities of daily living skills and compensatory activities      Date: 7/20/22 (visit 22)  7/22/22 (visit 23)  7/25/22 (visit 24)  7/27/22 (visit 25)  7/29/22 (visit 26)    Modalities:         Ice                        Therapeutic Exercise: 45 min  45 min 45 min  45 min  45 min             Bridge with kick out [id number]  Bridge with kick out [id number]; qped hip abduction 2x20 B; qped hip extension 2x20 B Qped hip extension 20x, qped hip abduction 20x Qped hip abduction 2x20 B; extension 20x B    Bike    5 min     Stretching repeat repeat Therapist assisted stretch into CLAUDIA, prone hip IR; prone quad [id number]; emma pose [id number] repeat repeat   Active warm up repeat repeat Lateral band walk, monster walk blue x 2 laps each repeat    Prone hip extension        Incline board        Step ups SL squat from 20\" plyo 4x10 B Step ups to 20\" plyo 3x8 slow  SL squat to 20\" plyo 3x8 B through full depth  Front squat 95# 4x8 with heels elevated thru full depth   Squat TBDL with 70# on each side 4x6 staggered stance   TBDL 70# on each side 4x10 with symmetric stance Skater squat 4x6 with TRX assist    Reverse hyperextensions Nordic hamstring curls 4x6 repeat Nordic hamstring curls 4x6  Nordic hamstring curls 4x6; SL RDL 95# 3x12 B    Lateral band walk        HS bridge        Planks  [place] [id number] with LLE on box      Leg press         RDL with 60# on each side 3x8 RDL with  60# on each side 3x8  RDL 70# on each side 4x6 RDL 70# on each side 3x10    Proprioceptive Activities:                                Manual Therapy:                        Functional Activities:                                            Treatment/Session Summary:    Treatment Assessment:     With front squatting and skater squats he continues to have difficulty descending below 100 ° of hip flexion due to weakness at end range of hip motion. Communication/Consultation:  None today  Equipment provided today:  None  Recommendations/Intent for next treatment session: Next visit will focus on progression of strength and return to prior level of function.     Total Treatment Billable Duration:   45 minutes  Time In: 3207  Time Out: 10 [facility]. Portal  MD Guidelines  Scanned Media  Benefits  MyChart

## 2022-08-01 ENCOUNTER — HOSPITAL ENCOUNTER (OUTPATIENT)
Dept: PHYSICAL THERAPY | Age: 22
Setting detail: RECURRING SERIES
Discharge: HOME | End: 2022-08-04
Payer: COMMERCIAL

## 2022-08-01 PROCEDURE — 97110 THERAPEUTIC EXERCISES: CPT

## 2022-08-01 NOTE — PROGRESS NOTES
Rolando Davila  : 2000  Primary: [address]  Secondary: [phone] Kranthi Doss @ [address]  [facility] [address]  Phone: [phone]  Fax: [phone] No data recorded  No data recorded    PT Visit Info: Total # of Visits to Date: 11          L hip labral repair DOS 22   OUTPATIENT PHYSICAL KGCTABS:ZK NOTE TYPE: Treatment Note 2022     Appt Desk   Episode      Treatment Diagnosis:  Pain in left hip (M25.552)  Stiffness of Left Hip, Not elsewhere classified (G78.880)  Medical/Referring Diagnosis:  Tear of left acetabular labrum, initial encounter [S39.821L]  Referring Physician:  Otoniel Hadley MD MD Orders:  PT Eval and Treat   Date of Onset:  No data recorded  Date of surgery: 22  Allergies:  Patient has no allergy information on record. see initial evaluation   Restrictions/Precautions:    No data recordedNo data recorded  per physician protocol included in paper chart  Interventions Planned (Treatment may consist of any combination of the following):    No data recorded  see initial evaluation   Subjective Comments:   Reports fatigue is well managed. Initial:  0    /10 Post Session:  0    /10  Medications Last Reviewed:  2022  Updated Objective Findings:      Short-Term Functional Goals: Time Frame: 6 weeks  1. Patient will be independent with HEP. MET  2. Patient will demonstrate sufficient healing to progress to WBAT. MET  3. Patient will demonstrate symmetric step length with gait to improve stability during community mobility. IMPROVING  Discharge Goals: Time Frame: 16 weeks ONGOING  1. Patient will demonstrate symmetric single leg hop and stick to restore dynamic stability for return to prior level of function. 2. Patient will demonstrate >90% symmetric on isokinetic knee extension and flexion testing to normalize strength for return to prior level of function.    3. Patient will demonstrate 5/5 strength with single leg squat to restore limb strength for return to prior level of function. 6/9/22:   Hip flexion: 90 ° with normal end feel  Hip extension: 15 °   Hip IR: 30 °   Hip abduction: 4/5   Hip extension: 4/5     7/11/22   Hip flexion: 120 ° with mild pinch on overpressure  Hip extension: 20 °   Hip abduction: 4+/5   Hip adduction: maintains copenhagen plank for 10sec  Single leg squat: 4/5       Treatment   TREATMENT:   THERAPEUTIC ACTIVITY: ( see below for minutes): Therapeutic activities per grid below to improve mobility, strength, balance and coordination. Required minimal visual, verbal, manual and tactile cues to improve independence and safety with daily activities . THERAPEUTIC EXERCISE: (see below for minutes): Exercises per grid below to improve mobility, strength, balance and coordination. Required minimal verbal and manual cues to promote proper body alignment, promote proper body posture and promote proper body mechanics. Progressed resistance, range, repetitions and complexity of movement as indicated. MANUAL THERAPY: (see below for minutes): Joint mobilization and Soft tissue mobilization was utilized and necessary because of the patient's restricted joint motion, painful spasm, loss of articular motion and restricted motion of soft tissue. MODALITIES: (see below for minutes): to decrease pain   SELF CARE: (see below for minutes): Procedure(s) (per grid) utilized to improve and/or restore self-care/home management as related to dressing, bathing and grooming. Required minimal verbal cueing to facilitate activities of daily living skills and compensatory activities      Date: 7/25/22 (visit 24)  7/27/22 (visit 25)  7/29/22 (visit 26)  8/1/22    Modalities:         Ice                        Therapeutic Exercise: 45 min  45 min  45 min  45 min              Bridge with kick out [id number]; qped hip abduction 2x20 B; qped hip extension 2x20 B Qped hip extension 20x, qped hip abduction 20x Qped hip abduction 2x20 B; extension 20x B      Bike  5 min       Stretching Therapist assisted stretch into CLAUDIA, prone hip IR; prone quad [id number]; emma pose 0x50jox repeat repeat repeat    Active warm up Lateral band walk, monster walk blue x 2 laps each repeat      Prone hip extension        Incline board        Step ups  SL squat to 20\" plyo 3x8 B through full depth  Front squat 95# 4x8 with heels elevated thru full depth Front squat 95# 3x8 heels elevated    Squat  TBDL 70# on each side 4x10 with symmetric stance Skater squat 4x6 with TRX assist  SL from plyo from floor 3x8     Reverse hyperextensions Nordic hamstring curls 4x6  Nordic hamstring curls 4x6; SL RDL 95# 3x12 B  Single leg RDL 55# 3x8 B     Lateral band walk        HS bridge        Planks        Leg press         RDL 70# on each side 4x6 RDL 70# on each side 3x10      Proprioceptive Activities:            A skips x3 laps; lateral shuffle x 3 laps, wall taps [id number], quick steps [id number]                    Manual Therapy:                        Functional Activities:                                            Treatment/Session Summary:    Treatment Assessment:     Progressed to light ploymetrics with good response. Communication/Consultation:  None today  Equipment provided today:  None  Recommendations/Intent for next treatment session: Next visit will focus on progression of strength and return to prior level of function.     Total Treatment Billable Duration:   45 minutes  Time In: 6466  Time Out: 10 [facility]. Portal  MD Guidelines  Scanned Media  Benefits  MyChart

## 2022-08-03 ENCOUNTER — HOSPITAL ENCOUNTER (OUTPATIENT)
Dept: PHYSICAL THERAPY | Age: 22
Setting detail: RECURRING SERIES
Discharge: HOME | End: 2022-08-06
Payer: COMMERCIAL

## 2022-08-03 PROCEDURE — 97110 THERAPEUTIC EXERCISES: CPT

## 2022-08-03 NOTE — PROGRESS NOTES
abduction 20x Qped hip abduction 2x20 B; extension 20x B      Bike  5 min       Stretching Therapist assisted stretch into CLAUDIA, prone hip IR; prone quad [id number]; emma pose [id number] repeat repeat repeat repeat   Active warm up Lateral band walk, monster walk blue x 2 laps each repeat      Prone hip extension        Incline board        Step ups  SL squat to 20\" plyo 3x8 B through full depth  Front squat 95# 4x8 with heels elevated thru full depth Front squat 95# 3x8 heels elevated SL squat through full depth from 20\" plyo 4x6   Squat  TBDL 70# on each side 4x10 with symmetric stance Skater squat 4x6 with TRX assist  SL from plyo from floor 3x8  TBDL 70# on each side symmetric stance with quick up and 3sec lower 4x8   Reverse hyperextensions Nordic hamstring curls 4x6  Nordic hamstring curls 4x6; SL RDL 95# 3x12 B  Single leg RDL 55# 3x8 B     Lateral band walk        HS bridge        Planks        Leg press         RDL 70# on each side 4x6 RDL 70# on each side 3x10      Proprioceptive Activities:            A skips x3 laps; lateral shuffle x 3 laps, wall taps [id number], quick steps [id number] Repeat, added carioca x 3 laps                   Manual Therapy:                        Functional Activities:                                            Treatment/Session Summary:    Treatment Assessment:     Initiated carioca to further challenge mobility into hip adduction. He responded to this well with no symptoms of pinching or discomfort. Communication/Consultation:  None today  Equipment provided today:  None  Recommendations/Intent for next treatment session: Next visit will focus on progression of strength and return to prior level of function.     Total Treatment Billable Duration:   45 minutes  Time In: 2385  Time Out: 10 [facility]. Portal  MD Guidelines  Scanned Media  Benefits  MyChart

## 2022-08-05 ENCOUNTER — HOSPITAL ENCOUNTER (OUTPATIENT)
Dept: PHYSICAL THERAPY | Age: 22
Setting detail: RECURRING SERIES
Discharge: HOME | End: 2022-08-08
Payer: COMMERCIAL

## 2022-08-05 PROCEDURE — 97110 THERAPEUTIC EXERCISES: CPT

## 2022-08-05 NOTE — PROGRESS NOTES
Alcides Chamber  : 2000  Primary: [address] 4752  Secondary: [phone] Kranthi Doss @ [address]  [facility] [address]  Phone: [phone]  Fax: [phone] No data recorded  No data recorded    PT Visit Info: Total # of Visits to Date: 11          L hip labral repair DOS 22   OUTPATIENT PHYSICAL SAVVRQV:MARYSOL NOTE TYPE: Treatment Note 2022     Appt Desk   Episode      Treatment Diagnosis:  Pain in left hip (M25.552)  Stiffness of Left Hip, Not elsewhere classified (Q07.532)  Medical/Referring Diagnosis:  Tear of left acetabular labrum, initial encounter [Z27.109W]  Referring Physician:  Zhen Kennedy MD MD Orders:  PT Eval and Treat   Date of Onset:  No data recorded  Date of surgery: 22  Allergies:  Patient has no allergy information on record. see initial evaluation   Restrictions/Precautions:    No data recordedNo data recorded  per physician protocol included in paper chart  Interventions Planned (Treatment may consist of any combination of the following):    No data recorded  see initial evaluation   Subjective Comments:  Reports hip feels like it's continuing to improve. Initial:  0    /10 Post Session:  0    /10  Medications Last Reviewed:  2022  Updated Objective Findings:      Short-Term Functional Goals: Time Frame: 6 weeks  1. Patient will be independent with HEP. MET  2. Patient will demonstrate sufficient healing to progress to WBAT. MET  3. Patient will demonstrate symmetric step length with gait to improve stability during community mobility. IMPROVING  Discharge Goals: Time Frame: 16 weeks ONGOING  1. Patient will demonstrate symmetric single leg hop and stick to restore dynamic stability for return to prior level of function. 2. Patient will demonstrate >90% symmetric on isokinetic knee extension and flexion testing to normalize strength for return to prior level of function.    3. Patient will demonstrate 5/5 strength with single leg squat to restore limb strength for return to prior level of function. 6/9/22:   Hip flexion: 90 ° with normal end feel  Hip extension: 15 °   Hip IR: 30 °   Hip abduction: 4/5   Hip extension: 4/5     7/11/22   Hip flexion: 120 ° with mild pinch on overpressure  Hip extension: 20 °   Hip abduction: 4+/5   Hip adduction: maintains copenhagen plank for 10sec  Single leg squat: 4/5       Treatment   TREATMENT:   THERAPEUTIC ACTIVITY: ( see below for minutes): Therapeutic activities per grid below to improve mobility, strength, balance and coordination. Required minimal visual, verbal, manual and tactile cues to improve independence and safety with daily activities . THERAPEUTIC EXERCISE: (see below for minutes): Exercises per grid below to improve mobility, strength, balance and coordination. Required minimal verbal and manual cues to promote proper body alignment, promote proper body posture and promote proper body mechanics. Progressed resistance, range, repetitions and complexity of movement as indicated. MANUAL THERAPY: (see below for minutes): Joint mobilization and Soft tissue mobilization was utilized and necessary because of the patient's restricted joint motion, painful spasm, loss of articular motion and restricted motion of soft tissue. MODALITIES: (see below for minutes): to decrease pain   SELF CARE: (see below for minutes): Procedure(s) (per grid) utilized to improve and/or restore self-care/home management as related to dressing, bathing and grooming. Required minimal verbal cueing to facilitate activities of daily living skills and compensatory activities      Date: 7/27/22 (visit 25)  7/29/22 (visit 26)  8/1/22 8/3/22 (visit 28) 8/5/22 (visit 29)   Modalities:         Ice                        Therapeutic Exercise: 45 min  45 min  45 min  45 min  45 min             Qped hip extension 20x, qped hip abduction 20x Qped hip abduction 2x20 B; extension 20x B       Bike 5 min     5 min    Stretching repeat repeat repeat repeat CLAUDIA, prone hip IR [id number] B    Active warm up repeat    Knee hug, lunge, lateral lunge x 1 lap; lateral band walk blue x 2 laps   Prone hip extension        Incline board        Step ups SL squat to 20\" plyo 3x8 B through full depth  Front squat 95# 4x8 with heels elevated thru full depth Front squat 95# 3x8 heels elevated SL squat through full depth from 20\" plyo 4x6    Squat TBDL 70# on each side 4x10 with symmetric stance Skater squat 4x6 with TRX assist  SL from plyo from floor 3x8  TBDL 70# on each side symmetric stance with quick up and 3sec lower 4x8 RFESS with reach to floor 25# 4x6   Reverse hyperextensions  Nordic hamstring curls 4x6; SL RDL 95# 3x12 B  Single leg RDL 55# 3x8 B   SL RDL 55# 4x6 B   Lateral band walk        HS bridge        Planks        Leg press         RDL 70# on each side 3x10       Proprioceptive Activities:           A skips x3 laps; lateral shuffle x 3 laps, wall taps [id number], quick steps [id number] Repeat, added carioca x 3 laps Repeat all as before        Squat jumps 4x6           Manual Therapy:                        Functional Activities:                                            Treatment/Session Summary:    Treatment Assessment:     Progressed to squat jumps to improve production of power. With landing he weight shifts to the right due to continued altered neural drive. He will be out of town over the next week, but anticipate initiating return to running progression on his return. Communication/Consultation:  None today  Equipment provided today:  None  Recommendations/Intent for next treatment session: Next visit will focus on progression of strength and return to prior level of function.     Total Treatment Billable Duration:   45 minutes  Time In: 5839  Time Out: 10 [facility]. Portal  MD Guidelines  Scanned Media  Benefits  MyChart

## 2022-08-15 ENCOUNTER — HOSPITAL ENCOUNTER (OUTPATIENT)
Dept: PHYSICAL THERAPY | Age: 22
Setting detail: RECURRING SERIES
Discharge: HOME | End: 2022-08-18
Payer: COMMERCIAL

## 2022-08-15 PROCEDURE — 97110 THERAPEUTIC EXERCISES: CPT

## 2022-08-15 NOTE — PROGRESS NOTES
Deven Sow  : 2000  Primary: [address]2  Secondary: [phone] Kranthi Doss @ [address]  [facility] [address]  Phone: [phone]  Fax: [phone] No data recorded  No data recorded    PT Visit Info: Total # of Visits to Date: 11          L hip labral repair DOS 22   OUTPATIENT PHYSICAL WUWAJCZ:TK NOTE TYPE: Treatment Note/Progress Note 8/15/2022     Appt Desk   Episode      Treatment Diagnosis:  Pain in left hip (M25.552)  Stiffness of Left Hip, Not elsewhere classified (D45.522)  Medical/Referring Diagnosis:  Tear of left acetabular labrum, initial encounter [I89.020O]  Referring Physician:  Beth Moon MD MD Orders:  PT Eval and Treat   Date of Onset:  No data recorded  Date of surgery: 22  Allergies:  Patient has no allergy information on record. see initial evaluation   Restrictions/Precautions:    No data recordedNo data recorded  per physician protocol included in paper chart  Interventions Planned (Treatment may consist of any combination of the following):    No data recorded  see initial evaluation   Subjective Comments:  Pt has been out of town over the past week. Continues to feel like the hip is improving. Initial:  0    /10 Post Session:  0    /10  Medications Last Reviewed:  8/15/2022  Updated Objective Findings:      Short-Term Functional Goals: Time Frame: 6 weeks  1. Patient will be independent with HEP. MET  2. Patient will demonstrate sufficient healing to progress to WBAT. MET  3. Patient will demonstrate symmetric step length with gait to improve stability during community mobility. IMPROVING  Discharge Goals: Time Frame: 16 weeks ONGOING  1. Patient will demonstrate symmetric single leg hop and stick to restore dynamic stability for return to prior level of function.    2. Patient will demonstrate >90% symmetric on isokinetic knee extension and flexion testing to normalize strength for return to prior level of function. 3. Patient will demonstrate 5/5 strength with single leg squat to restore limb strength for return to prior level of function. 6/9/22:   Hip flexion: 90 ° with normal end feel  Hip extension: 15 °   Hip IR: 30 °   Hip abduction: 4/5   Hip extension: 4/5     7/11/22   Hip flexion: 120 ° with mild pinch on overpressure  Hip extension: 20 °   Hip abduction: 4+/5   Hip adduction: maintains copenhagen plank for 10sec  Single leg squat: 4/5     8/15/22:   Hip flexion: full and pain free with overpressure  Hip adduction: maintains copenhagen plank for 30 seconds  Single leg squat 4+/5; has difficulty controlling frontal plane position below 60 ° of knee flexion       Treatment   TREATMENT:   THERAPEUTIC ACTIVITY: ( see below for minutes): Therapeutic activities per grid below to improve mobility, strength, balance and coordination. Required minimal visual, verbal, manual and tactile cues to improve independence and safety with daily activities . THERAPEUTIC EXERCISE: (see below for minutes): Exercises per grid below to improve mobility, strength, balance and coordination. Required minimal verbal and manual cues to promote proper body alignment, promote proper body posture and promote proper body mechanics. Progressed resistance, range, repetitions and complexity of movement as indicated. MANUAL THERAPY: (see below for minutes): Joint mobilization and Soft tissue mobilization was utilized and necessary because of the patient's restricted joint motion, painful spasm, loss of articular motion and restricted motion of soft tissue. MODALITIES: (see below for minutes): to decrease pain   SELF CARE: (see below for minutes): Procedure(s) (per grid) utilized to improve and/or restore self-care/home management as related to dressing, bathing and grooming.  Required minimal verbal cueing to facilitate activities of daily living skills and compensatory activities      Date: 8/1/22 8/3/22 (visit 28) 8/5/22 (visit 29) 8/15/22 (visit 30)    Modalities: Ice                     Therapeutic Exercise: 45 min  45 min  45 min  45 min                  Bike   5 min  5 min    Stretching repeat repeat CLAUDIA, prone hip IR [id number] B     Active warm up   Knee hug, lunge, lateral lunge x 1 lap; lateral band walk blue x 2 laps Knee hug, lunge, lateral lunge x 1 lap, lateral band walk blue x 2 laps   Prone hip extension       Incline board       Step ups Front squat 95# 3x8 heels elevated SL squat through full depth from 20\" plyo 4x6  SL squat through full depth from 20\" plyo 3x8   Squat SL from plyo from floor 3x8  TBDL 70# on each side symmetric stance with quick up and 3sec lower 4x8 RFESS with reach to floor 25# 4x6    Reverse hyperextensions Single leg RDL 55# 3x8 B   SL RDL 55# 4x6 B    Lateral band walk       HS bridge       Planks       Leg press              Proprioceptive Activities:        A skips x3 laps; lateral shuffle x 3 laps, wall taps [id number], quick steps [id number] Repeat, added carioca x 3 laps Repeat all as before A skips, lateral shuffle x 3 laps      Squat jumps 4x6 Squat jump 3x8; bounding in place 3x8       Fwd run 60-80% effort 10 lengths of bball court with walk back recovery; 5 reps backward run cueing hip extension to half court   Manual Therapy:                     Functional Activities:                                       Treatment/Session Summary:    Treatment Assessment:     Initiated return to running progression today. He demonstrates good hip extension on the left during terminal swing. With squat jumps, he continues to offload to the left due to continued deficits in proprioception of the limb. Communication/Consultation:  None today  Equipment provided today:  None  Recommendations/Intent for next treatment session: Next visit will focus on progression of strength and return to prior level of function.     Total Treatment Billable Duration:   45 minutes  Time In: 4037  Time Out: 6056 [facility] Session Pain  Charge Capture  [facility] Portal  MD Guidelines  Scanned Media  Benefits  MyChart

## 2022-08-17 ENCOUNTER — HOSPITAL ENCOUNTER (OUTPATIENT)
Dept: PHYSICAL THERAPY | Age: 22
Setting detail: RECURRING SERIES
Discharge: HOME | End: 2022-08-20
Payer: COMMERCIAL

## 2022-08-17 PROCEDURE — 97110 THERAPEUTIC EXERCISES: CPT

## 2022-08-19 ENCOUNTER — HOSPITAL ENCOUNTER (OUTPATIENT)
Dept: PHYSICAL THERAPY | Age: 22
Setting detail: RECURRING SERIES
Discharge: HOME | End: 2022-08-22
Payer: COMMERCIAL

## 2022-08-19 PROCEDURE — 97110 THERAPEUTIC EXERCISES: CPT

## 2022-08-19 NOTE — PROGRESS NOTES
Danitza Nicholas  : 2000  Primary: [address]  Secondary: [phone] Kranthi Doss @ [address]  [place] [address]  Phone: [phone]  Fax: [phone] No data recorded  No data recorded    PT Visit Info: Total # of Visits to Date: 11          L hip labral repair DOS 22   OUTPATIENT PHYSICAL SIXXVND:KU NOTE TYPE: Treatment Note 2022     Appt Desk   Episode      Treatment Diagnosis:  Pain in left hip (M25.552)  Stiffness of Left Hip, Not elsewhere classified (V14.874)  Medical/Referring Diagnosis:  Tear of left acetabular labrum, initial encounter [I33.416A]  Referring Physician:  Thomas Gray MD MD Orders:  PT Eval and Treat   Date of Onset:  No data recorded  Date of surgery: 22  Allergies:  Patient has no allergy information on record. see initial evaluation   Restrictions/Precautions:    No data recordedNo data recorded  per physician protocol included in paper chart  Interventions Planned (Treatment may consist of any combination of the following):    No data recorded  see initial evaluation   Subjective Comments:  Notes the hip has been feeling good. Initial:  0    /10 Post Session:  0    /10  Medications Last Reviewed:  2022  Updated Objective Findings:      Short-Term Functional Goals: Time Frame: 6 weeks  1. Patient will be independent with HEP. MET  2. Patient will demonstrate sufficient healing to progress to WBAT. MET  3. Patient will demonstrate symmetric step length with gait to improve stability during community mobility. IMPROVING  Discharge Goals: Time Frame: 16 weeks ONGOING  1. Patient will demonstrate symmetric single leg hop and stick to restore dynamic stability for return to prior level of function. 2. Patient will demonstrate >90% symmetric on isokinetic knee extension and flexion testing to normalize strength for return to prior level of function.    3. Patient will demonstrate 5/5 strength with single leg squat to restore limb strength for return to prior level of function. 6/9/22:   Hip flexion: 90 ° with normal end feel  Hip extension: 15 °   Hip IR: 30 °   Hip abduction: 4/5   Hip extension: 4/5     7/11/22   Hip flexion: 120 ° with mild pinch on overpressure  Hip extension: 20 °   Hip abduction: 4+/5   Hip adduction: maintains copenhagen plank for 10sec  Single leg squat: 4/5     8/15/22:   Hip flexion: full and pain free with overpressure  Hip adduction: maintains copenhagen plank for 30 seconds  Single leg squat 4+/5; has difficulty controlling frontal plane position below 60 ° of knee flexion       Treatment   TREATMENT:   THERAPEUTIC ACTIVITY: ( see below for minutes): Therapeutic activities per grid below to improve mobility, strength, balance and coordination. Required minimal visual, verbal, manual and tactile cues to improve independence and safety with daily activities . THERAPEUTIC EXERCISE: (see below for minutes): Exercises per grid below to improve mobility, strength, balance and coordination. Required minimal verbal and manual cues to promote proper body alignment, promote proper body posture and promote proper body mechanics. Progressed resistance, range, repetitions and complexity of movement as indicated. MANUAL THERAPY: (see below for minutes): Joint mobilization and Soft tissue mobilization was utilized and necessary because of the patient's restricted joint motion, painful spasm, loss of articular motion and restricted motion of soft tissue. MODALITIES: (see below for minutes): to decrease pain   SELF CARE: (see below for minutes): Procedure(s) (per grid) utilized to improve and/or restore self-care/home management as related to dressing, bathing and grooming.  Required minimal verbal cueing to facilitate activities of daily living skills and compensatory activities      Date: 8/5/22 (visit 29) 8/15/22 (visit 30)  PN  8/18/22 (visit 31)  8/19/22 (visit 28) Modalities: Ice                     Therapeutic Exercise: 45 min  45 min  45 min  45 min                  Bike 5 min  5 min  5 min  5 min    Stretching CLAUDIA, prone hip IR [id number] B       Active warm up Knee hug, lunge, lateral lunge x 1 lap; lateral band walk blue x 2 laps Knee hug, lunge, lateral lunge x 1 lap, lateral band walk blue x 2 laps repeat    Prone hip extension    Nordic hamstring curls 4x6   Incline board       Step ups  SL squat through full depth from 20\" plyo 3x8  SL squat through full depth from 20\" plyo 3x8   Squat RFESS with reach to floor 25# 4x6      Reverse hyperextensions SL RDL 55# 4x6 B      Lateral band walk       HS bridge       Planks       Leg press              Proprioceptive Activities:        Repeat all as before A skips, lateral shuffle x 3 laps A skips, lateral shuffle, carioca x3 laps A skips, lateral shuffle, carioca x 3 laps    Squat jumps 4x6 Squat jump 3x8; bounding in place 3x8 Squat jumps 3x8; bounding in place 3x8; bounding for distance x3 laps repeat     Fwd run 60-80% effort 10 lengths of bball court with walk back recovery; 5 reps backward run cueing hip extension to half court repeat Repeat; fwd/back accel/decel x 6 repeats   Manual Therapy:                     Functional Activities:                                       Treatment/Session Summary:    Treatment Assessment:     With change of direction work he demonstrates good eccentric control of hip and knee flexion. Communication/Consultation:  None today  Equipment provided today:  None  Recommendations/Intent for next treatment session: Next visit will focus on progression of strength and return to prior level of function.     Total Treatment Billable Duration:   45 minutes  Time In: 1437  Time Out: 3160 [facility] Session Pain  Charge Capture  [employer] Portal  MD Guidelines  Scanned Media  Benefits  MyChart

## 2022-08-22 ENCOUNTER — HOSPITAL ENCOUNTER (OUTPATIENT)
Dept: PHYSICAL THERAPY | Age: 22
Setting detail: RECURRING SERIES
Discharge: HOME | End: 2022-08-25
Payer: COMMERCIAL

## 2022-08-22 PROCEDURE — 97110 THERAPEUTIC EXERCISES: CPT

## 2022-08-22 NOTE — PROGRESS NOTES
Britany Arredondo  : 2000  Primary: [address]2  Secondary: [address]anna [phone] Kranthi Doss @ [address]  [facility] [address]  Phone: [phone]  Fax: [phone] No data recorded  No data recorded    PT Visit Info: Total # of Visits to Date: 11          L hip labral repair DOS 22   OUTPATIENT PHYSICAL NGBEXGF:VQ NOTE TYPE: Treatment Note 2022     Appt Desk   Episode      Treatment Diagnosis:  Pain in left hip (M25.552)  Stiffness of Left Hip, Not elsewhere classified (O44.079)  Medical/Referring Diagnosis:  Tear of left acetabular labrum, initial encounter [S11.847V]  Referring Physician:  Tyra Stallings MD MD Orders:  PT Eval and Treat   Date of Onset:  No data recorded  Date of surgery: 22  Allergies:  Patient has no allergy information on record. see initial evaluation   Restrictions/Precautions:    No data recordedNo data recorded  per physician protocol included in paper chart  Interventions Planned (Treatment may consist of any combination of the following):    No data recorded  see initial evaluation   Subjective Comments:  Feeling good. Starting to incorporate more active movement with good comfort. Initial:  0    /10 Post Session:  0    /10  Medications Last Reviewed:  2022  Updated Objective Findings:      Short-Term Functional Goals: Time Frame: 6 weeks  1. Patient will be independent with HEP. MET  2. Patient will demonstrate sufficient healing to progress to WBAT. MET  3. Patient will demonstrate symmetric step length with gait to improve stability during community mobility. IMPROVING  Discharge Goals: Time Frame: 16 weeks ONGOING  1. Patient will demonstrate symmetric single leg hop and stick to restore dynamic stability for return to prior level of function. 2. Patient will demonstrate >90% symmetric on isokinetic knee extension and flexion testing to normalize strength for return to prior level of function. 3. Patient will demonstrate 5/5 strength with single leg squat to restore limb strength for return to prior level of function. 6/9/22:   Hip flexion: 90 ° with normal end feel  Hip extension: 15 °   Hip IR: 30 °   Hip abduction: 4/5   Hip extension: 4/5     7/11/22   Hip flexion: 120 ° with mild pinch on overpressure  Hip extension: 20 °   Hip abduction: 4+/5   Hip adduction: maintains copenhagen plank for 10sec  Single leg squat: 4/5     8/15/22:   Hip flexion: full and pain free with overpressure  Hip adduction: maintains copenhagen plank for 30 seconds  Single leg squat 4+/5; has difficulty controlling frontal plane position below 60 ° of knee flexion       Treatment   TREATMENT:   THERAPEUTIC ACTIVITY: ( see below for minutes): Therapeutic activities per grid below to improve mobility, strength, balance and coordination. Required minimal visual, verbal, manual and tactile cues to improve independence and safety with daily activities . THERAPEUTIC EXERCISE: (see below for minutes): Exercises per grid below to improve mobility, strength, balance and coordination. Required minimal verbal and manual cues to promote proper body alignment, promote proper body posture and promote proper body mechanics. Progressed resistance, range, repetitions and complexity of movement as indicated. MANUAL THERAPY: (see below for minutes): Joint mobilization and Soft tissue mobilization was utilized and necessary because of the patient's restricted joint motion, painful spasm, loss of articular motion and restricted motion of soft tissue. MODALITIES: (see below for minutes): to decrease pain   SELF CARE: (see below for minutes): Procedure(s) (per grid) utilized to improve and/or restore self-care/home management as related to dressing, bathing and grooming.  Required minimal verbal cueing to facilitate activities of daily living skills and compensatory activities      Date: 8/5/22 (visit 29) 8/15/22 (visit 30)  PN  8/18/22 (visit 31)  8/19/22 (visit 32) 8/22/22 (visit 33)    Modalities: Ice                        Therapeutic Exercise: 45 min  45 min  45 min  45 min  30 min                    Bike 5 min  5 min  5 min  5 min  5 min    Stretching CLAUDIA, prone hip IR [id number] B        Active warm up Knee hug, lunge, lateral lunge x 1 lap; lateral band walk blue x 2 laps Knee hug, lunge, lateral lunge x 1 lap, lateral band walk blue x 2 laps repeat     Prone hip extension    Nordic hamstring curls 4x6 Nordic hamstring curls 4x6   Incline board        Step ups  SL squat through full depth from 20\" plyo 3x8  SL squat through full depth from 20\" plyo 3x8    Squat RFESS with reach to floor 25# 4x6       Reverse hyperextensions SL RDL 55# 4x6 B       Lateral band walk        HS bridge        Planks        Leg press                Proprioceptive Activities:         Repeat all as before A skips, lateral shuffle x 3 laps A skips, lateral shuffle, carioca x3 laps A skips, lateral shuffle, carioca x 3 laps Knee hugs, lunge, lateral lunge x 1 lap; a skips, lateral shuffle x 3 laps    Squat jumps 4x6 Squat jump 3x8; bounding in place 3x8 Squat jumps 3x8; bounding in place 3x8; bounding for distance x3 laps repeat Squat jumps 3x8; lateral bound 3x8; bounding for distance x 3 laps; SL hop x3 laps bounding in place 3x8     Fwd run 60-80% effort 10 lengths of bball court with walk back recovery; 5 reps backward run cueing hip extension to half court repeat Repeat; fwd/back accel/decel x 6 repeats    Manual Therapy:                        Functional Activities:                                            Treatment/Session Summary:    Treatment Assessment:     Progressed to single leg jumping. With left leg work he demonstrates decreased strength with shorter step length.        Communication/Consultation:  None today  Equipment provided today:  None  Recommendations/Intent for next treatment session: Next visit will focus on progression of strength and return to prior level of function.     Total Treatment Billable Duration:   30 minutes  Time In: 1630  Time Out: 10 [facility]. Portal  MD Guidelines  Scanned Media  Benefits  MyChart

## 2022-08-24 ENCOUNTER — HOSPITAL ENCOUNTER (OUTPATIENT)
Dept: PHYSICAL THERAPY | Age: 22
Setting detail: RECURRING SERIES
Discharge: HOME | End: 2022-08-27
Payer: COMMERCIAL

## 2022-08-24 PROCEDURE — 97110 THERAPEUTIC EXERCISES: CPT

## 2022-08-25 NOTE — PROGRESS NOTES
Phebe Cockayne  : 2000  Primary: [address]  Secondary: [phone] Kranthi Doss @ [address]  [facility] [address]  Phone: [phone]  Fax: [phone] No data recorded  No data recorded    PT Visit Info: Total # of Visits to Date: 11          L hip labral repair DOS 22   OUTPATIENT PHYSICAL TZMARTR:SAMIRA NOTE TYPE: Treatment Note 2022     Appt Desk   Episode      Treatment Diagnosis:  Pain in left hip (M25.552)  Stiffness of Left Hip, Not elsewhere classified (E63.648)  Medical/Referring Diagnosis:  Tear of left acetabular labrum, initial encounter [V87.861E]  Referring Physician:  Jaida Oneill MD MD Orders:  PT Eval and Treat   Date of Onset:  No data recorded  Date of surgery: 22  Allergies:  Patient has no allergy information on record. see initial evaluation   Restrictions/Precautions:    No data recordedNo data recorded  per physician protocol included in paper chart  Interventions Planned (Treatment may consist of any combination of the following):    No data recorded  see initial evaluation   Subjective Comments:  No new complaints. Initial:  0    /10 Post Session:  0    /10  Medications Last Reviewed:  2022  Updated Objective Findings:      Short-Term Functional Goals: Time Frame: 6 weeks  1. Patient will be independent with HEP. MET  2. Patient will demonstrate sufficient healing to progress to WBAT. MET  3. Patient will demonstrate symmetric step length with gait to improve stability during community mobility. IMPROVING  Discharge Goals: Time Frame: 16 weeks ONGOING  1. Patient will demonstrate symmetric single leg hop and stick to restore dynamic stability for return to prior level of function. 2. Patient will demonstrate >90% symmetric on isokinetic knee extension and flexion testing to normalize strength for return to prior level of function.    3. Patient will demonstrate 5/5 strength with single leg (visit 34)   Modalities:          Ice                           Therapeutic Exercise: 45 min  45 min  45 min  45 min  30 min  45 min                      Bike 5 min  5 min  5 min  5 min  5 min     Stretching CLAUDIA, prone hip IR [id number] B         Active warm up Knee hug, lunge, lateral lunge x 1 lap; lateral band walk blue x 2 laps Knee hug, lunge, lateral lunge x 1 lap, lateral band walk blue x 2 laps repeat   Knee hug, lunge, lateral lunge, lateral band walk x 1 lap; a skips, lateral shuffle, carioca x 2 laps ea   Prone hip extension    Nordic hamstring curls 4x6 Nordic hamstring curls 4x6    Incline board         Step ups  SL squat through full depth from 20\" plyo 3x8  SL squat through full depth from 20\" plyo 3x8     Squat RFESS with reach to floor 25# 4x6        Reverse hyperextensions SL RDL 55# 4x6 B        Lateral band walk         HS bridge         Planks         Leg press                  Proprioceptive Activities:          Repeat all as before A skips, lateral shuffle x 3 laps A skips, lateral shuffle, carioca x3 laps A skips, lateral shuffle, carioca x 3 laps Knee hugs, lunge, lateral lunge x 1 lap; a skips, lateral shuffle x 3 laps     Squat jumps 4x6 Squat jump 3x8; bounding in place 3x8 Squat jumps 3x8; bounding in place 3x8; bounding for distance x3 laps repeat Squat jumps 3x8; lateral bound 3x8; bounding for distance x 3 laps; SL hop x3 laps bounding in place 3x8 Squat jumps x8, lateral bound x8; SL fwd hops x3 laps; unanticipated change of direction x8 trials 5 direction; figure 8 runs [id number]; lateral line hops [id number]     Fwd run 60-80% effort 10 lengths of bball court with walk back recovery; 5 reps backward run cueing hip extension to half court repeat Repeat; fwd/back accel/decel x 6 repeats  Fwd run 80% effort x8 lengths  of bball court; backwards run x5 reps to half court   Manual Therapy:                           Functional Activities: Treatment/Session Summary:    Treatment Assessment:     With unanticipated change of direction he demonstrates good control and stability with hip rotation. Communication/Consultation:  None today  Equipment provided today:  None  Recommendations/Intent for next treatment session: Next visit will focus on progression of strength and return to prior level of function.     Total Treatment Billable Duration:   45 minutes  Time In: 9278  Time Out: 1175 [facility] Portal  MD Guidelines  Scanned Media  Benefits  MyChart

## 2022-08-26 ENCOUNTER — HOSPITAL ENCOUNTER (OUTPATIENT)
Dept: PHYSICAL THERAPY | Age: 22
Setting detail: RECURRING SERIES
Discharge: HOME | End: 2022-08-29
Payer: COMMERCIAL

## 2022-08-26 PROCEDURE — 97110 THERAPEUTIC EXERCISES: CPT

## 2022-08-26 NOTE — PROGRESS NOTES
Patient will demonstrate 5/5 strength with single leg squat to restore limb strength for return to prior level of function. 6/9/22:   Hip flexion: 90 ° with normal end feel  Hip extension: 15 °   Hip IR: 30 °   Hip abduction: 4/5   Hip extension: 4/5     7/11/22   Hip flexion: 120 ° with mild pinch on overpressure  Hip extension: 20 °   Hip abduction: 4+/5   Hip adduction: maintains copenhagen plank for 10sec  Single leg squat: 4/5     8/15/22:   Hip flexion: full and pain free with overpressure  Hip adduction: maintains copenhagen plank for 30 seconds  Single leg squat 4+/5; has difficulty controlling frontal plane position below 60 ° of knee flexion       Treatment   TREATMENT:   THERAPEUTIC ACTIVITY: ( see below for minutes): Therapeutic activities per grid below to improve mobility, strength, balance and coordination. Required minimal visual, verbal, manual and tactile cues to improve independence and safety with daily activities . THERAPEUTIC EXERCISE: (see below for minutes): Exercises per grid below to improve mobility, strength, balance and coordination. Required minimal verbal and manual cues to promote proper body alignment, promote proper body posture and promote proper body mechanics. Progressed resistance, range, repetitions and complexity of movement as indicated. MANUAL THERAPY: (see below for minutes): Joint mobilization and Soft tissue mobilization was utilized and necessary because of the patient's restricted joint motion, painful spasm, loss of articular motion and restricted motion of soft tissue. MODALITIES: (see below for minutes): to decrease pain   SELF CARE: (see below for minutes): Procedure(s) (per grid) utilized to improve and/or restore self-care/home management as related to dressing, bathing and grooming.  Required minimal verbal cueing to facilitate activities of daily living skills and compensatory activities      Date: 8/5/22 (visit 29) 8/15/22 (visit 30)  PN  8/18/22 (visit 31)  8/19/22 (visit 32) 8/22/22 (visit 33)  8/24/22 (visit 34) 8/26/22 (visit 35)   Modalities:           Ice                              Therapeutic Exercise: 45 min  45 min  45 min  45 min  30 min  45 min  45 min                        Bike 5 min  5 min  5 min  5 min  5 min      Stretching CLAUDIA, prone hip IR [id number] B          Active warm up Knee hug, lunge, lateral lunge x 1 lap; lateral band walk blue x 2 laps Knee hug, lunge, lateral lunge x 1 lap, lateral band walk blue x 2 laps repeat   Knee hug, lunge, lateral lunge, lateral band walk x 1 lap; a skips, lateral shuffle, carioca x 2 laps ea Knee hug, lunge, lateral lunge x 1 lap; lateral band walk x 3 laps; a skips, lateral shuffle, carioca x3 laps   Prone hip extension    Nordic hamstring curls 4x6 Nordic hamstring curls 4x6  Nordic hamstring curls 3x6   Incline board          Step ups  SL squat through full depth from 20\" plyo 3x8  SL squat through full depth from 20\" plyo 3x8      Squat RFESS with reach to floor 25# 4x6         Reverse hyperextensions SL RDL 55# 4x6 B         Lateral band walk          HS bridge          Planks          Leg press                    Proprioceptive Activities:           Repeat all as before A skips, lateral shuffle x 3 laps A skips, lateral shuffle, carioca x3 laps A skips, lateral shuffle, carioca x 3 laps Knee hugs, lunge, lateral lunge x 1 lap; a skips, lateral shuffle x 3 laps      Squat jumps 4x6 Squat jump 3x8; bounding in place 3x8 Squat jumps 3x8; bounding in place 3x8; bounding for distance x3 laps repeat Squat jumps 3x8; lateral bound 3x8; bounding for distance x 3 laps; SL hop x3 laps bounding in place 3x8 Squat jumps x8, lateral bound x8; SL fwd hops x3 laps; unanticipated change of direction x8 trials 5 direction; figure 8 runs [id number]; lateral line hops [id number] Squat jumps 3x8; lateral bound 3x8; SL vertical jump 3x8; fwd hop and stick x3 laps; line hops SL [id number]     Fwd run 60-80% effort 10 lengths of bball court with walk back recovery; 5 reps backward run cueing hip extension to half court repeat Repeat; fwd/back accel/decel x 6 repeats  Fwd run 80% effort x8 lengths  of bball court; backwards run x5 reps to half court    Manual Therapy:                              Functional Activities:                                                      Treatment/Session Summary:    Treatment Assessment:     Pt performs all exercises with good technique. Communication/Consultation:  None today  Equipment provided today:  None  Recommendations/Intent for next treatment session: Next visit will focus on progression of strength and return to prior level of function.     Total Treatment Billable Duration:   45 minutes  Time In: 9581  Time Out: 10 [facility]. Portal  MD Guidelines  Scanned Media  Benefits  MyChart

## 2022-08-29 ENCOUNTER — HOSPITAL ENCOUNTER (OUTPATIENT)
Dept: PHYSICAL THERAPY | Age: 22
Setting detail: RECURRING SERIES
Discharge: HOME | End: 2022-09-01
Payer: COMMERCIAL

## 2022-08-29 PROCEDURE — 97110 THERAPEUTIC EXERCISES: CPT

## 2022-08-29 NOTE — PROGRESS NOTES
Sung Hensley  : 2000  Primary: Jaydon Valencialoreto Costa  [facility] [address] [id number] Kranthi Doss @ [id number] [facility] [address]  Phone: [phone]  Fax: [phone] No data recorded  No data recorded    PT Visit Info: Total # of Visits to Date: 11          L hip labral repair DOS 22   OUTPATIENT PHYSICAL DDOWNOF:IH NOTE TYPE: Treatment Note 2022     Appt Desk   Episode      Treatment Diagnosis:  Pain in left hip (M25.552)  Stiffness of Left Hip, Not elsewhere classified (Z53.107)  Medical/Referring Diagnosis:  Tear of left acetabular labrum, initial encounter [C26.048V]  Referring Physician:  Maritza Salas MD MD Orders:  PT Eval and Treat   Date of Onset:  No data recorded  Date of surgery: 22  Allergies:  Patient has no allergy information on record. see initial evaluation   Restrictions/Precautions:    No data recordedNo data recorded  per physician protocol included in paper chart  Interventions Planned (Treatment may consist of any combination of the following):    No data recorded  see initial evaluation   Subjective Comments:  Notes general overall fatigue today, but denies any pain at the hip. Initial:  0    /10 Post Session:  0    /10  Medications Last Reviewed:  2022  Updated Objective Findings:      Short-Term Functional Goals: Time Frame: 6 weeks  1. Patient will be independent with HEP. MET  2. Patient will demonstrate sufficient healing to progress to WBAT. MET  3. Patient will demonstrate symmetric step length with gait to improve stability during community mobility. IMPROVING  Discharge Goals: Time Frame: 16 weeks ONGOING  1. Patient will demonstrate symmetric single leg hop and stick to restore dynamic stability for return to prior level of function. 2. Patient will demonstrate >90% symmetric on isokinetic knee extension and flexion testing to normalize strength for return to prior level of function.    3. Patient will demonstrate 5/5 strength with single leg squat to restore limb strength for return to prior level of function. 6/9/22:   Hip flexion: 90 ° with normal end feel  Hip extension: 15 °   Hip IR: 30 °   Hip abduction: 4/5   Hip extension: 4/5     7/11/22   Hip flexion: 120 ° with mild pinch on overpressure  Hip extension: 20 °   Hip abduction: 4+/5   Hip adduction: maintains copenhagen plank for 10sec  Single leg squat: 4/5     8/15/22:   Hip flexion: full and pain free with overpressure  Hip adduction: maintains copenhagen plank for 30 seconds  Single leg squat 4+/5; has difficulty controlling frontal plane position below 60 ° of knee flexion       Treatment   TREATMENT:   THERAPEUTIC ACTIVITY: ( see below for minutes): Therapeutic activities per grid below to improve mobility, strength, balance and coordination. Required minimal visual, verbal, manual and tactile cues to improve independence and safety with daily activities . THERAPEUTIC EXERCISE: (see below for minutes): Exercises per grid below to improve mobility, strength, balance and coordination. Required minimal verbal and manual cues to promote proper body alignment, promote proper body posture and promote proper body mechanics. Progressed resistance, range, repetitions and complexity of movement as indicated. MANUAL THERAPY: (see below for minutes): Joint mobilization and Soft tissue mobilization was utilized and necessary because of the patient's restricted joint motion, painful spasm, loss of articular motion and restricted motion of soft tissue. MODALITIES: (see below for minutes): to decrease pain   SELF CARE: (see below for minutes): Procedure(s) (per grid) utilized to improve and/or restore self-care/home management as related to dressing, bathing and grooming.  Required minimal verbal cueing to facilitate activities of daily living skills and compensatory activities      Date: 8/5/22 (visit 29) 8/15/22 (visit 30)  PN  8/18/22 (visit 31)  8/19/22 (visit 28) 8/22/22 (visit 33)  8/24/22 (visit 34) 8/26/22 (visit 35) 8/29/22 (visit 36)   Modalities:            Ice                                 Therapeutic Exercise: 45 min  45 min  45 min  45 min  30 min  45 min  45 min  45 min                          Bike 5 min  5 min  5 min  5 min  5 min       Stretching CLAUDIA, prone hip IR [id number] B           Active warm up Knee hug, lunge, lateral lunge x 1 lap; lateral band walk blue x 2 laps Knee hug, lunge, lateral lunge x 1 lap, lateral band walk blue x 2 laps repeat   Knee hug, lunge, lateral lunge, lateral band walk x 1 lap; a skips, lateral shuffle, carioca x 2 laps ea Knee hug, lunge, lateral lunge x 1 lap; lateral band walk x 3 laps; a skips, lateral shuffle, carioca x3 laps Repeat all   Prone hip extension    Nordic hamstring curls 4x6 Nordic hamstring curls 4x6  Nordic hamstring curls 3x6    Incline board           Step ups  SL squat through full depth from 20\" plyo 3x8  SL squat through full depth from 20\" plyo 3x8       Squat RFESS with reach to floor 25# 4x6          Reverse hyperextensions SL RDL 55# 4x6 B          Lateral band walk           HS bridge           Planks           Leg press                      Proprioceptive Activities:            Repeat all as before A skips, lateral shuffle x 3 laps A skips, lateral shuffle, carioca x3 laps A skips, lateral shuffle, carioca x 3 laps Knee hugs, lunge, lateral lunge x 1 lap; a skips, lateral shuffle x 3 laps       Squat jumps 4x6 Squat jump 3x8; bounding in place 3x8 Squat jumps 3x8; bounding in place 3x8; bounding for distance x3 laps repeat Squat jumps 3x8; lateral bound 3x8; bounding for distance x 3 laps; SL hop x3 laps bounding in place 3x8 Squat jumps x8, lateral bound x8; SL fwd hops x3 laps; unanticipated change of direction x8 trials 5 direction; figure 8 runs [id number]; lateral line hops [id number] Squat jumps 3x8; lateral bound 3x8; SL vertical jump 3x8; fwd hop and stick x3 laps; line hops SL [id number] Squat jumps 3x8; lateral 1,2 stick 3x8; forward hop x 3 laps, triple hop x 3 laps     Fwd run 60-80% effort 10 lengths of bball court with walk back recovery; 5 reps backward run cueing hip extension to half court repeat Repeat; fwd/back accel/decel x 6 repeats  Fwd run 80% effort x8 lengths  of bball court; backwards run x5 reps to half court     Manual Therapy:                                 Functional Activities:                                                           Treatment/Session Summary:    Treatment Assessment:     Progressed to triple hop to improve elasticity and decrease ground contact time during gait. With LLE he demonstrates mild difficulty initially at toe off with generating hip flexion. Decreased volume of overall work today due to fatigue. Communication/Consultation:  None today  Equipment provided today:  None  Recommendations/Intent for next treatment session: Next visit will focus on progression of strength and return to prior level of function.     Total Treatment Billable Duration:   30 minutes  Time In: 8485  Time Out: [facility] Session Pain  Charge Capture  [facility] Portal  MD Guidelines  Scanned Media  Benefits  MyChart

## 2022-08-31 ENCOUNTER — HOSPITAL ENCOUNTER (OUTPATIENT)
Dept: PHYSICAL THERAPY | Age: 22
Setting detail: RECURRING SERIES
Discharge: HOME | End: 2022-09-03
Payer: COMMERCIAL

## 2022-08-31 PROCEDURE — 97110 THERAPEUTIC EXERCISES: CPT

## 2022-09-01 NOTE — PROGRESS NOTES
to restore limb strength for return to prior level of function. 6/9/22:   Hip flexion: 90 ° with normal end feel  Hip extension: 15 °   Hip IR: 30 °   Hip abduction: 4/5   Hip extension: 4/5     7/11/22   Hip flexion: 120 ° with mild pinch on overpressure  Hip extension: 20 °   Hip abduction: 4+/5   Hip adduction: maintains copenhagen plank for 10sec  Single leg squat: 4/5     8/15/22:   Hip flexion: full and pain free with overpressure  Hip adduction: maintains copenhagen plank for 30 seconds  Single leg squat 4+/5; has difficulty controlling frontal plane position below 60 ° of knee flexion         Treatment   TREATMENT:   THERAPEUTIC ACTIVITY: ( see below for minutes): Therapeutic activities per grid below to improve mobility, strength, balance and coordination. Required minimal visual, verbal, manual and tactile cues to improve independence and safety with daily activities . THERAPEUTIC EXERCISE: (see below for minutes): Exercises per grid below to improve mobility, strength, balance and coordination. Required minimal verbal and manual cues to promote proper body alignment, promote proper body posture and promote proper body mechanics. Progressed resistance, range, repetitions and complexity of movement as indicated. MANUAL THERAPY: (see below for minutes): Joint mobilization and Soft tissue mobilization was utilized and necessary because of the patient's restricted joint motion, painful spasm, loss of articular motion and restricted motion of soft tissue. MODALITIES: (see below for minutes): to decrease pain   SELF CARE: (see below for minutes): Procedure(s) (per grid) utilized to improve and/or restore self-care/home management as related to dressing, bathing and grooming.  Required minimal verbal cueing to facilitate activities of daily living skills and compensatory activities      Date: 8/5/22 (visit 29) 8/15/22 (visit 30)  PN  8/18/22 (visit 31)  8/19/22 (visit 32) 8/22/22 (visit 33)  8/24/22 (visit 34) 8/26/22 (visit 35) 8/29/22 (visit 36) 8/31/22 (visit 37)    Modalities:             Ice                                    Therapeutic Exercise: 45 min  45 min  45 min  45 min  30 min  45 min  45 min  45 min  45 min                         Hip circuit: bridge with kick out [id number], clamshell 40x; qped hip extension 40x; qped hip abduction 40x all B   Bike 5 min  5 min  5 min  5 min  5 min        Stretching CLAUDIA, prone hip IR [id number] B         CLAUDIA; prone quad, prone hip IR [id number] to all    Active warm up Knee hug, lunge, lateral lunge x 1 lap; lateral band walk blue x 2 laps Knee hug, lunge, lateral lunge x 1 lap, lateral band walk blue x 2 laps repeat   Knee hug, lunge, lateral lunge, lateral band walk x 1 lap; a skips, lateral shuffle, carioca x 2 laps ea Knee hug, lunge, lateral lunge x 1 lap; lateral band walk x 3 laps; a skips, lateral shuffle, carioca x3 laps Repeat all Repeat all   Prone hip extension    Nordic hamstring curls 4x6 Nordic hamstring curls 4x6  Nordic hamstring curls 3x6     Incline board            Step ups  SL squat through full depth from 20\" plyo 3x8  SL squat through full depth from 20\" plyo 3x8        Squat RFESS with reach to floor 25# 4x6           Reverse hyperextensions SL RDL 55# 4x6 B           Lateral band walk            HS bridge            Planks            Leg press                        Proprioceptive Activities:             Repeat all as before A skips, lateral shuffle x 3 laps A skips, lateral shuffle, carioca x3 laps A skips, lateral shuffle, carioca x 3 laps Knee hugs, lunge, lateral lunge x 1 lap; a skips, lateral shuffle x 3 laps        Squat jumps 4x6 Squat jump 3x8; bounding in place 3x8 Squat jumps 3x8; bounding in place 3x8; bounding for distance x3 laps repeat Squat jumps 3x8; lateral bound 3x8; bounding for distance x 3 laps; SL hop x3 laps bounding in place 3x8 Squat jumps x8, lateral bound x8; SL fwd hops x3 laps; unanticipated change of direction x8 trials 5 direction; figure 8 runs [id number]; lateral line hops [id number] Squat jumps 3x8; lateral bound 3x8; SL vertical jump 3x8; fwd hop and stick x3 laps; line hops SL [id number] Squat jumps 3x8; lateral 1,2 stick 3x8; forward hop x 3 laps, triple hop x 3 laps      Fwd run 60-80% effort 10 lengths of bball court with walk back recovery; 5 reps backward run cueing hip extension to half court repeat Repeat; fwd/back accel/decel x 6 repeats  Fwd run 80% effort x8 lengths  of bball court; backwards run x5 reps to half court      Manual Therapy:                                    Functional Activities:                                                                Treatment/Session Summary:    Treatment Assessment:     During bridge with kick out under conditions of fatigue he has difficulty maintaining full hip extension. Patient demonstrates good dynamic stability in single leg positions with plyometrics. Communication/Consultation:  None today  Equipment provided today:  None  Recommendations/Intent for next treatment session: continue to progress strength and ROM to return to prior level of function.       Total Treatment Billable Duration:   45 minutes  Time In: 8452  Time Out: 10 [facility]. Portal  MD Guidelines  Scanned Media  Benefits  MyChart

## 2022-09-02 ENCOUNTER — HOSPITAL ENCOUNTER (OUTPATIENT)
Dept: PHYSICAL THERAPY | Age: 22
Setting detail: RECURRING SERIES
Discharge: HOME | End: 2022-09-05
Payer: COMMERCIAL

## 2022-09-02 PROCEDURE — 97110 THERAPEUTIC EXERCISES: CPT

## 2022-09-02 NOTE — PROGRESS NOTES
Britany Maria Elena  : 2000  Primary: Alin Plunkett Sc  Secondary: [place] [id number] Kranthi Doss @ [address] [facility] [address]  Phone: [phone]  Fax: [phone] No data recorded  No data recorded    PT Visit Info: Total # of Visits to Date: 11          L hip labral repair DOS 22   OUTPATIENT PHYSICAL DCRVFHT:TS NOTE TYPE: Treatment Note/Discharge 2022     Appt Desk   Episode      Treatment Diagnosis:  Pain in left hip (M25.552)  Stiffness of Left Hip, Not elsewhere classified (D91.533)  Medical/Referring Diagnosis:  Tear of left acetabular labrum, initial encounter [M79.126G]  Referring Physician:  Tyra Stallings MD MD Orders:  PT Eval and Treat   Date of Onset:  No data recorded  Date of surgery: 22  Allergies:  Patient has no allergy information on record. see initial evaluation   Restrictions/Precautions:    No data recordedNo data recorded  per physician protocol included in paper chart  Interventions Planned (Treatment may consist of any combination of the following):    No data recorded  see initial evaluation   Subjective Comments:Notes the hip has been doing really good. Initial:  0    /10 Post Session:  0    /10  Medications Last Reviewed:  2022  Updated Objective Findings:      Short-Term Functional Goals: Time Frame: 6 weeks  1. Patient will be independent with HEP. MET  2. Patient will demonstrate sufficient healing to progress to WBAT. MET  3. Patient will demonstrate symmetric step length with gait to improve stability during community mobility. MET  Discharge Goals: Time Frame: 16 weeks MET  1. Patient will demonstrate symmetric single leg hop and stick to restore dynamic stability for return to prior level of function. 2. Patient will demonstrate >90% symmetric on isokinetic knee extension and flexion testing to normalize strength for return to prior level of function.    3. Patient will demonstrate 5/5 strength with single leg squat to restore limb strength for return to prior level of function. 6/9/22:   Hip flexion: 90 ° with normal end feel  Hip extension: 15 °   Hip IR: 30 °   Hip abduction: 4/5   Hip extension: 4/5     7/11/22   Hip flexion: 120 ° with mild pinch on overpressure  Hip extension: 20 °   Hip abduction: 4+/5   Hip adduction: maintains copenhagen plank for 10sec  Single leg squat: 4/5     8/15/22:   Hip flexion: full and pain free with overpressure  Hip adduction: maintains copenhagen plank for 30 seconds  Single leg squat 4+/5; has difficulty controlling frontal plane position below 60 ° of knee flexion     9/2/22:   Strength is 5/5 throughout. Single and triple hop testing are symmetric  LEFS: 77/80      Treatment   TREATMENT:   THERAPEUTIC ACTIVITY: ( see below for minutes): Therapeutic activities per grid below to improve mobility, strength, balance and coordination. Required minimal visual, verbal, manual and tactile cues to improve independence and safety with daily activities . THERAPEUTIC EXERCISE: (see below for minutes): Exercises per grid below to improve mobility, strength, balance and coordination. Required minimal verbal and manual cues to promote proper body alignment, promote proper body posture and promote proper body mechanics. Progressed resistance, range, repetitions and complexity of movement as indicated. MANUAL THERAPY: (see below for minutes): Joint mobilization and Soft tissue mobilization was utilized and necessary because of the patient's restricted joint motion, painful spasm, loss of articular motion and restricted motion of soft tissue. MODALITIES: (see below for minutes): to decrease pain   SELF CARE: (see below for minutes): Procedure(s) (per grid) utilized to improve and/or restore self-care/home management as related to dressing, bathing and grooming.  Required minimal verbal cueing to facilitate activities of daily living skills and compensatory activities      Date: 8/5/22 (visit 29) 8/15/22 (visit 30)  PN  8/18/22 (visit 31)  8/19/22 (visit 32) 8/22/22 (visit 33)  8/24/22 (visit 34) 8/26/22 (visit 35) 8/29/22 (visit 36) 8/31/22 (visit 40)  9/2/22 (visit 38)    Modalities:              Ice                                       Therapeutic Exercise: 45 min  45 min  45 min  45 min  30 min  45 min  45 min  45 min  45 min  45 min                          Hip circuit: bridge with kick out [id number], clamshell 40x; qped hip extension 40x; qped hip abduction 40x all B    Bike 5 min  5 min  5 min  5 min  5 min         Stretching CLAUDIA, prone hip IR [id number]         CLAUDIA; prone quad, prone hip IR [id number] to all     Active warm up Knee hug, lunge, lateral lunge x 1 lap; lateral band walk blue x 2 laps Knee hug, lunge, lateral lunge x 1 lap, lateral band walk blue x 2 laps repeat   Knee hug, lunge, lateral lunge, lateral band walk x 1 lap; a skips, lateral shuffle, carioca x 2 laps ea Knee hug, lunge, lateral lunge x 1 lap; lateral band walk x 3 laps; a skips, lateral shuffle, carioca x3 laps Repeat all Repeat all Knee hug, lateral lunge, walking lunge x 1 lap; a skips, lateral shuffle, carioca x 2 laps   Prone hip extension    Nordic hamstring curls 4x6 Nordic hamstring curls 4x6  Nordic hamstring curls 3x6      Incline board             Step ups  SL squat through full depth from 20\" plyo 3x8  SL squat through full depth from 20\" plyo 3x8         Squat RFESS with reach to floor 25# 4x6            Reverse hyperextensions SL RDL 55# 4x6 B            Lateral band walk             HS bridge             Planks             Leg press                          Proprioceptive Activities:              Repeat all as before A skips, lateral shuffle x 3 laps A skips, lateral shuffle, carioca x3 laps A skips, lateral shuffle, carioca x 3 laps Knee hugs, lunge, lateral lunge x 1 lap; a skips, lateral shuffle x 3 laps     Unanticipated change of direction to cones x5 min; figure 8 [id number]    Squat jumps 4x6 Squat jump 3x8; bounding in place 3x8 Squat jumps 3x8; bounding in place 3x8; bounding for distance x3 laps repeat Squat jumps 3x8; lateral bound 3x8; bounding for distance x 3 laps; SL hop x3 laps bounding in place 3x8 Squat jumps x8, lateral bound x8; SL fwd hops x3 laps; unanticipated change of direction x8 trials 5 direction; figure 8 runs [id number]; lateral line hops [id number] Squat jumps 3x8; lateral bound 3x8; SL vertical jump 3x8; fwd hop and stick x3 laps; line hops SL [id number] Squat jumps 3x8; lateral 1,2 stick 3x8; forward hop x 3 laps, triple hop x 3 laps  Squat jumps x 8, lateral 1,2 stick x8, forward hops x3 laps, triple hops x3 laps     Fwd run 60-80% effort 10 lengths of bball court with walk back recovery; 5 reps backward run cueing hip extension to half court repeat Repeat; fwd/back accel/decel x 6 repeats  Fwd run 80% effort x8 lengths  of bball court; backwards run x5 reps to half court       Manual Therapy:                                       Functional Activities:                                                                     Treatment/Session Summary:    Treatment Assessment:     Patient has met all goals and returned to prior level of function. Will d/c to functional sport progression overseen by sport ATC and strength . Communication/Consultation:  None today  Equipment provided today:  None  Recommendations/Intent for next treatment session: will d/c to HEP.      Total Treatment Billable Duration:   45 minutes  Time In: 0408  Time Out: 3160 [facility] Session Pain  Charge Capture  [employer] Portal  MD Guidelines  Scanned Media  Benefits  MyChart

## 2022-09-06 ENCOUNTER — APPOINTMENT (OUTPATIENT)
Dept: PHYSICAL THERAPY | Age: 22
End: 2022-09-06
Payer: COMMERCIAL

## 2022-09-07 ENCOUNTER — APPOINTMENT (OUTPATIENT)
Dept: PHYSICAL THERAPY | Age: 22
End: 2022-09-07
Payer: COMMERCIAL

## 2022-09-09 ENCOUNTER — APPOINTMENT (OUTPATIENT)
Dept: PHYSICAL THERAPY | Age: 22
End: 2022-09-09
Payer: COMMERCIAL

## 2022-09-19 ENCOUNTER — APPOINTMENT (OUTPATIENT)
Dept: PHYSICAL THERAPY | Age: 22
End: 2022-09-19
Payer: COMMERCIAL

## 2022-09-21 ENCOUNTER — APPOINTMENT (OUTPATIENT)
Dept: PHYSICAL THERAPY | Age: 22
End: 2022-09-21
Payer: COMMERCIAL

## 2022-09-23 ENCOUNTER — APPOINTMENT (OUTPATIENT)
Dept: PHYSICAL THERAPY | Age: 22
End: 2022-09-23
Payer: COMMERCIAL

## 2022-09-26 ENCOUNTER — APPOINTMENT (OUTPATIENT)
Dept: PHYSICAL THERAPY | Age: 22
End: 2022-09-26
Payer: COMMERCIAL

## 2022-09-28 ENCOUNTER — APPOINTMENT (OUTPATIENT)
Dept: PHYSICAL THERAPY | Age: 22
End: 2022-09-28
Payer: COMMERCIAL

## 2022-09-30 ENCOUNTER — APPOINTMENT (OUTPATIENT)
Dept: PHYSICAL THERAPY | Age: 22
End: 2022-09-30
Payer: COMMERCIAL

## 2023-01-30 DIAGNOSIS — D64.9 ANEMIA, UNSPECIFIED TYPE: Primary | ICD-10-CM

## 2023-01-30 DIAGNOSIS — R53.83 FATIGUE, UNSPECIFIED TYPE: ICD-10-CM

## 2023-01-31 DIAGNOSIS — R53.83 FATIGUE, UNSPECIFIED TYPE: ICD-10-CM

## 2023-01-31 DIAGNOSIS — D64.9 ANEMIA, UNSPECIFIED TYPE: ICD-10-CM

## 2023-01-31 LAB
25(OH)D3 SERPL-MCNC: 38 NG/ML (ref 30–100)
FOLATE SERPL-MCNC: 20 NG/ML (ref 3.1–17.5)
VIT B12 SERPL-MCNC: 843 PG/ML (ref 193–986)

## 2023-02-01 DIAGNOSIS — D64.9 BORDERLINE ANEMIA: Primary | ICD-10-CM

## 2023-02-01 NOTE — PROGRESS NOTES
[facility] MEDICINE   TRAINING ROOM     Gianna Nixon  2000  No chief complaint on file. 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 First information that came to me regarding this fatigue was when they were out of town for away game. He had been seen at [facility] and had preliminary labs that showed a slightly low hemoglobin and platelet count. He had a shoulder labral repair in April last year. 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 He reported to his ATC Uriah Wilcox that he has been feeling more fatigued during practices. At the end of more difficult practices he feels like he cant keep up. He doesn't get very short of breath. He has no chest pain. He denies history of cardiac evaluation, cardiac issues, family history of HCM, SCI. Mom has a history of AVNRT s/p ablation x 2 and protein C deficiency. He was screened for this, per history, and negative. He thinks mom has some issues with anemia. Negative screen for sickle cell. He has had issues falling asleep. Has been going to bed at midnight or 1-2 am sometimes and wakes up at 8am. He doesn't drink caffeine or take stimulants. He denies blood loss in stool, abdominal pain, constipation, diarrhea. He does state he gets full quickly when eating. For example, he gets through 2/3 of a chipotle bowl and feels full. No significant flatulence. Doesn't have issues eating dairy. His dad is lactose intolerant. No family history of Crohn's or UC known. He eats meat, usually chicken. He denies any recent illness. He has not noticed any irregular masses / lumps. No known family hx of lymphoma. OBJECTIVE:     General: Well appearing, no acute distress. HEENT: NC/AT, EOMI, MMM. Normal conjunctiva.  Small mobile lymph node anterior cervical left, otherwise no masses. Normal thyroid. CV: RRR, normal cap refill. No murmurs, rubs, gallops  Pulmonary: No increased WOB or respiratory distress  Abdomen: soft, nondistended, nontender, no masses or guarding  :  Skin: Warm, dry  MSK:   Neuro: A, Ox3   Psych: Mood and affect congruent    WBC:  4.0   HGB: 12.8   MCV, MCH, MCHC, RDW all WNL   Platelets 314   Reticulocyte % and abs WNL. Immature retic fraction 8.9, mildly low     Protein C activity low at 61 (%), protein C antigen low 49 (N %)     CMP WNL  B12, folate, Vt D wnl    Iron studies WNL:   Ferritin 150  Iron 99  TIBC 304  Transferrin 243  Transferrin % sat 29    Negative hepatitis B/C and HIV screen     ASSESSMENT / PLAN:       ICD-10-CM    1. Borderline anemia  D64.9            He has been evaluated by cardiology and hematology at [facility] assisted by [facility]. I have reviewed lab studies performed. Notable findings are borderline hemoglobin, mild low protein C. An Echo and EKG were performed at my recommendation. I have not been able to obtain those records due to no cross over of records in our system yet, but was informed they were normal by staff at [facility]. Hematology at [facility] performed an evaluation and added the lab work up. They suspected B12 deficiency may be etiology and advised supplementation. He is meant to follow up with hematology in 1 month. I have advised we have him see sports nutrition for dietary evaluation. If he continues to have early satiety issues may need to consider abdominal imaging. He has returned to play and is feeling improved somewhat. We will continue to monitor for persistent or worsening symptoms, and with his hematology follow up, anticipate repeat hgb in 1 month for recheck. He should notify ATC if symptoms are worsening.      Cande Jarvis MD

## 2023-03-28 ENCOUNTER — OFFICE VISIT (OUTPATIENT)
Dept: ORTHOPEDIC SURGERY | Age: 23
End: 2023-03-28

## 2023-03-28 DIAGNOSIS — M25.859 FEMORAL ACETABULAR IMPINGEMENT: Primary | ICD-10-CM

## 2023-03-28 DIAGNOSIS — M25.551 RIGHT HIP PAIN: ICD-10-CM

## 2023-03-28 NOTE — PROGRESS NOTES
Name: Alcides Bernard  YOB: 2000  Gender: male  MRN: [id number]      CC: Hip Pain (R)       HPI: Alcides Bernard is a 23 y.o. male who presents with Hip Pain (R)  Rowena Adair is a [employer]  who has had pain in his R hip for the entirety of this past season. He denies any mechanism or instance where he recalls acute pain. He is status post left hip arthroscopy last year which he did very well from. He took a week following the end of the regular season this year, and has recently started back up and his pain has returned. He localizes the pain deep in his groin in a C sign type distribution      Physical Examination:  General: no acute distress  Lungs: breathing easily  CV: regular rhythm by pulse  Right Hip: Tenderness palpation anterior lateral.  Pain with logroll internal/external rotation positive FADIR with only about 5 degrees internal rotation mild pain with Geo Cady with 35 degrees of external rotation. Pain with Stinchfield good resisted adduction and abduction strength. Imaging:   Hip/pelvis XR: LITO 4 views     Clinical Indication  1. Femoral acetabular impingement    2. Right hip pain           Report: AP, robles and frog lateral, false profile x-ray of the Right hip demonstrates cam deformity with prominence of the head neck junction of the offset    Impression: LITO as above, no acute findings            All imaging interpreted by myself Scott T. Isom Kayser, MD independent of radiology review        Assessment:     ICD-10-CM    1. Femoral acetabular impingement  M25.859       2. Right hip pain  M25.551 XR HIP RIGHT (2-3 VIEWS)     MRI HIP RIGHT WO CONTRAST          Plan:   LITO with concerning exam for labral pathology. We will proceed with an MRI scan to evaluate for chondral labral pathology. I will see him back to review this and make definitive treatment plan              Scott T. Isom Kayser MD, [address] [place] [address] Sports Medicine

## 2023-04-04 ENCOUNTER — HOSPITAL ENCOUNTER (OUTPATIENT)
Dept: MRI IMAGING | Age: 23
Discharge: HOME | End: 2023-04-07

## 2023-04-04 DIAGNOSIS — M25.551 RIGHT HIP PAIN: ICD-10-CM

## 2023-04-05 ENCOUNTER — CLINICAL DOCUMENTATION (OUTPATIENT)
Dept: ORTHOPEDIC SURGERY | Age: 23
End: 2023-04-05

## 2023-04-05 DIAGNOSIS — M25.551 RIGHT HIP PAIN: ICD-10-CM

## 2023-04-05 DIAGNOSIS — M25.859 FEMORAL ACETABULAR IMPINGEMENT: ICD-10-CM

## 2023-04-05 DIAGNOSIS — M25.859 FEMORAL ACETABULAR IMPINGEMENT: Primary | ICD-10-CM

## 2023-04-05 DIAGNOSIS — M25.551 RIGHT HIP PAIN: Primary | ICD-10-CM

## 2023-04-05 RX ORDER — UREA 10 %
1 LOTION (ML) TOPICAL NIGHTLY
COMMUNITY

## 2023-04-05 RX ORDER — CETIRIZINE HYDROCHLORIDE 10 MG/1
10 TABLET ORAL DAILY
COMMUNITY

## 2023-04-05 NOTE — PROGRESS NOTES
I reviewed the MRI scan of his right hip which demonstrates a large anterior superior acetabular labral tear with a large paralabral cyst.  Findings consistent with cam based LITO. Discussed with the patient and his  treatment options. He is familiar with surgical intervention of his left hip with cam and rim osteoplasty which he did very well from last year. I think given the large magnitude of the tear and his significant symptoms which are classic in a C sign type distribution that have been longstanding despite conservative management with physical therapy anti-inflammatories and activity modification it is reasonable to proceed with right hip arthroscopy. We discussed operative and nonoperative options with patient and he would like to proceed with surgical intervention.     Surgical plan will be for right hip arthroscopy cam and rim osteoplasty and labral repair

## 2023-04-06 ENCOUNTER — ANESTHESIA EVENT (OUTPATIENT)
Dept: SURGERY | Age: 23
End: 2023-04-06
Payer: COMMERCIAL

## 2023-04-06 DIAGNOSIS — M25.859 FEMORAL ACETABULAR IMPINGEMENT: Primary | ICD-10-CM

## 2023-04-06 RX ORDER — OXYCODONE HYDROCHLORIDE 5 MG/1
5 TABLET ORAL EVERY 6 HOURS PRN
Qty: 15 TABLET | Refills: 0 | Status: SHIPPED | OUTPATIENT
Start: 2023-04-06 | End: 2023-04-13

## 2023-04-06 RX ORDER — INDOMETHACIN 75 MG/1
75 CAPSULE, EXTENDED RELEASE ORAL
Qty: 3 CAPSULE | Refills: 0 | Status: SHIPPED | OUTPATIENT
Start: 2023-04-06

## 2023-04-06 RX ORDER — MELOXICAM 7.5 MG/1
7.5 TABLET ORAL DAILY
Qty: 30 TABLET | Refills: 3 | Status: SHIPPED | OUTPATIENT
Start: 2023-04-06

## 2023-04-07 ENCOUNTER — HOSPITAL ENCOUNTER (OUTPATIENT)
Age: 23
Setting detail: OUTPATIENT SURGERY
Discharge: HOME | End: 2023-04-07
Attending: ORTHOPAEDIC SURGERY | Admitting: ORTHOPAEDIC SURGERY
Payer: COMMERCIAL

## 2023-04-07 ENCOUNTER — ANESTHESIA (OUTPATIENT)
Dept: SURGERY | Age: 23
End: 2023-04-07
Payer: COMMERCIAL

## 2023-04-07 ENCOUNTER — APPOINTMENT (OUTPATIENT)
Dept: GENERAL RADIOLOGY | Age: 23
End: 2023-04-07
Attending: ORTHOPAEDIC SURGERY
Payer: COMMERCIAL

## 2023-04-07 VITALS
OXYGEN SATURATION: 100 % | TEMPERATURE: 97.7 F | WEIGHT: 220.1 LBS | SYSTOLIC BLOOD PRESSURE: 146 MMHG | HEART RATE: 56 BPM | RESPIRATION RATE: 16 BRPM | BODY MASS INDEX: 25.47 KG/M2 | DIASTOLIC BLOOD PRESSURE: 76 MMHG | HEIGHT: 78 IN

## 2023-04-07 DIAGNOSIS — Z09 S/P ORTHOPEDIC SURGERY, FOLLOW-UP EXAM: Primary | ICD-10-CM

## 2023-04-07 DIAGNOSIS — S73.192D TEAR OF LEFT ACETABULAR LABRUM, SUBSEQUENT ENCOUNTER: ICD-10-CM

## 2023-04-07 DIAGNOSIS — M25.859 FEMORAL ACETABULAR IMPINGEMENT: ICD-10-CM

## 2023-04-07 DIAGNOSIS — M25.551 RIGHT HIP PAIN: ICD-10-CM

## 2023-04-07 PROCEDURE — 6360000002 HC RX W HCPCS: Performed by: ORTHOPAEDIC SURGERY

## 2023-04-07 PROCEDURE — 6360000002 HC RX W HCPCS

## 2023-04-07 PROCEDURE — 2500000003 HC RX 250 WO HCPCS

## 2023-04-07 PROCEDURE — 2580000003 HC RX 258: Performed by: ORTHOPAEDIC SURGERY

## 2023-04-07 PROCEDURE — 6370000000 HC RX 637 (ALT 250 FOR IP): Performed by: ANESTHESIOLOGY

## 2023-04-07 PROCEDURE — 6360000002 HC RX W HCPCS: Performed by: ANESTHESIOLOGY

## 2023-04-07 PROCEDURE — A4216 STERILE WATER/SALINE, 10 ML: HCPCS | Performed by: ORTHOPAEDIC SURGERY

## 2023-04-07 PROCEDURE — 2580000003 HC RX 258: Performed by: ANESTHESIOLOGY

## 2023-04-07 RX ORDER — PHENYLEPHRINE HYDROCHLORIDE 10 MG/ML
INJECTION INTRAVENOUS PRN
Status: DISCONTINUED | OUTPATIENT
Start: 2023-04-07 | End: 2023-04-07 | Stop reason: SDUPTHER

## 2023-04-07 RX ORDER — EPINEPHRINE 1 MG/ML(1)
AMPUL (ML) INJECTION PRN
Status: DISCONTINUED | OUTPATIENT
Start: 2023-04-07 | End: 2023-04-07 | Stop reason: HOSPADM

## 2023-04-07 RX ORDER — OXYCODONE HYDROCHLORIDE 5 MG/1
10 TABLET ORAL PRN
Status: COMPLETED | OUTPATIENT
Start: 2023-04-07 | End: 2023-04-07

## 2023-04-07 RX ORDER — SODIUM CHLORIDE, SODIUM LACTATE, POTASSIUM CHLORIDE, CALCIUM CHLORIDE 600; 310; 30; 20 MG/100ML; MG/100ML; MG/100ML; MG/100ML
INJECTION, SOLUTION INTRAVENOUS CONTINUOUS
Status: DISCONTINUED | OUTPATIENT
Start: 2023-04-07 | End: 2023-04-07 | Stop reason: HOSPADM

## 2023-04-07 RX ORDER — ROPIVACAINE HYDROCHLORIDE 5 MG/ML
INJECTION, SOLUTION EPIDURAL; INFILTRATION; PERINEURAL PRN
Status: DISCONTINUED | OUTPATIENT
Start: 2023-04-07 | End: 2023-04-07 | Stop reason: HOSPADM

## 2023-04-07 RX ORDER — ACETAMINOPHEN 500 MG
1000 TABLET ORAL ONCE
Status: COMPLETED | OUTPATIENT
Start: 2023-04-07 | End: 2023-04-07

## 2023-04-07 RX ORDER — SODIUM CHLORIDE 0.9 % (FLUSH) 0.9 %
5-40 SYRINGE (ML) INJECTION PRN
Status: DISCONTINUED | OUTPATIENT
Start: 2023-04-07 | End: 2023-04-07 | Stop reason: HOSPADM

## 2023-04-07 RX ORDER — SODIUM CHLORIDE 9 MG/ML
INJECTION, SOLUTION INTRAVENOUS PRN
Status: DISCONTINUED | OUTPATIENT
Start: 2023-04-07 | End: 2023-04-07 | Stop reason: HOSPADM

## 2023-04-07 RX ORDER — GLYCOPYRROLATE 0.2 MG/ML
INJECTION INTRAMUSCULAR; INTRAVENOUS PRN
Status: DISCONTINUED | OUTPATIENT
Start: 2023-04-07 | End: 2023-04-07 | Stop reason: SDUPTHER

## 2023-04-07 RX ORDER — ONDANSETRON 2 MG/ML
INJECTION INTRAMUSCULAR; INTRAVENOUS PRN
Status: DISCONTINUED | OUTPATIENT
Start: 2023-04-07 | End: 2023-04-07 | Stop reason: SDUPTHER

## 2023-04-07 RX ORDER — HYDROMORPHONE HYDROCHLORIDE 2 MG/ML
0.25 INJECTION, SOLUTION INTRAMUSCULAR; INTRAVENOUS; SUBCUTANEOUS EVERY 5 MIN PRN
Status: DISCONTINUED | OUTPATIENT
Start: 2023-04-07 | End: 2023-04-07 | Stop reason: HOSPADM

## 2023-04-07 RX ORDER — FENTANYL CITRATE 50 UG/ML
INJECTION, SOLUTION INTRAMUSCULAR; INTRAVENOUS PRN
Status: DISCONTINUED | OUTPATIENT
Start: 2023-04-07 | End: 2023-04-07 | Stop reason: SDUPTHER

## 2023-04-07 RX ORDER — KETAMINE HYDROCHLORIDE 50 MG/ML
INJECTION, SOLUTION, CONCENTRATE INTRAMUSCULAR; INTRAVENOUS PRN
Status: DISCONTINUED | OUTPATIENT
Start: 2023-04-07 | End: 2023-04-07 | Stop reason: SDUPTHER

## 2023-04-07 RX ORDER — DIPHENHYDRAMINE HYDROCHLORIDE 50 MG/ML
12.5 INJECTION INTRAMUSCULAR; INTRAVENOUS
Status: DISCONTINUED | OUTPATIENT
Start: 2023-04-07 | End: 2023-04-07 | Stop reason: HOSPADM

## 2023-04-07 RX ORDER — EPHEDRINE SULFATE/0.9% NACL/PF 50 MG/5 ML
SYRINGE (ML) INTRAVENOUS PRN
Status: DISCONTINUED | OUTPATIENT
Start: 2023-04-07 | End: 2023-04-07 | Stop reason: SDUPTHER

## 2023-04-07 RX ORDER — MIDAZOLAM HYDROCHLORIDE 2 MG/2ML
2 INJECTION, SOLUTION INTRAMUSCULAR; INTRAVENOUS
Status: DISCONTINUED | OUTPATIENT
Start: 2023-04-07 | End: 2023-04-07 | Stop reason: HOSPADM

## 2023-04-07 RX ORDER — TRANEXAMIC ACID 100 MG/ML
INJECTION, SOLUTION INTRAVENOUS PRN
Status: DISCONTINUED | OUTPATIENT
Start: 2023-04-07 | End: 2023-04-07 | Stop reason: SDUPTHER

## 2023-04-07 RX ORDER — LIDOCAINE HYDROCHLORIDE 20 MG/ML
INJECTION, SOLUTION EPIDURAL; INFILTRATION; INTRACAUDAL; PERINEURAL PRN
Status: DISCONTINUED | OUTPATIENT
Start: 2023-04-07 | End: 2023-04-07 | Stop reason: SDUPTHER

## 2023-04-07 RX ORDER — KETAMINE HCL IN NACL, ISO-OSM 20 MG/2 ML
SYRINGE (ML) INJECTION
Status: DISCONTINUED
Start: 2023-04-07 | End: 2023-04-07 | Stop reason: HOSPADM

## 2023-04-07 RX ORDER — ONDANSETRON 2 MG/ML
4 INJECTION INTRAMUSCULAR; INTRAVENOUS
Status: COMPLETED | OUTPATIENT
Start: 2023-04-07 | End: 2023-04-07

## 2023-04-07 RX ORDER — SODIUM CHLORIDE 9 MG/ML
INJECTION INTRAVENOUS PRN
Status: DISCONTINUED | OUTPATIENT
Start: 2023-04-07 | End: 2023-04-07 | Stop reason: HOSPADM

## 2023-04-07 RX ORDER — ROCURONIUM BROMIDE 10 MG/ML
INJECTION, SOLUTION INTRAVENOUS PRN
Status: DISCONTINUED | OUTPATIENT
Start: 2023-04-07 | End: 2023-04-07 | Stop reason: SDUPTHER

## 2023-04-07 RX ORDER — OXYCODONE HYDROCHLORIDE 5 MG/1
5 TABLET ORAL PRN
Status: COMPLETED | OUTPATIENT
Start: 2023-04-07 | End: 2023-04-07

## 2023-04-07 RX ORDER — NEOSTIGMINE METHYLSULFATE 1 MG/ML
INJECTION, SOLUTION INTRAVENOUS PRN
Status: DISCONTINUED | OUTPATIENT
Start: 2023-04-07 | End: 2023-04-07 | Stop reason: SDUPTHER

## 2023-04-07 RX ORDER — HYDROMORPHONE HYDROCHLORIDE 2 MG/ML
0.5 INJECTION, SOLUTION INTRAMUSCULAR; INTRAVENOUS; SUBCUTANEOUS EVERY 10 MIN PRN
Status: DISCONTINUED | OUTPATIENT
Start: 2023-04-07 | End: 2023-04-07 | Stop reason: HOSPADM

## 2023-04-07 RX ORDER — KETOROLAC TROMETHAMINE 30 MG/ML
INJECTION, SOLUTION INTRAMUSCULAR; INTRAVENOUS PRN
Status: DISCONTINUED | OUTPATIENT
Start: 2023-04-07 | End: 2023-04-07 | Stop reason: HOSPADM

## 2023-04-07 RX ORDER — FENTANYL CITRATE 50 UG/ML
100 INJECTION, SOLUTION INTRAMUSCULAR; INTRAVENOUS
Status: DISCONTINUED | OUTPATIENT
Start: 2023-04-07 | End: 2023-04-07 | Stop reason: HOSPADM

## 2023-04-07 RX ORDER — SODIUM CHLORIDE 0.9 % (FLUSH) 0.9 %
5-40 SYRINGE (ML) INJECTION EVERY 12 HOURS SCHEDULED
Status: DISCONTINUED | OUTPATIENT
Start: 2023-04-07 | End: 2023-04-07 | Stop reason: HOSPADM

## 2023-04-07 RX ORDER — DEXAMETHASONE SODIUM PHOSPHATE 10 MG/ML
INJECTION INTRAMUSCULAR; INTRAVENOUS PRN
Status: DISCONTINUED | OUTPATIENT
Start: 2023-04-07 | End: 2023-04-07 | Stop reason: SDUPTHER

## 2023-04-07 RX ORDER — PROPOFOL 10 MG/ML
INJECTION, EMULSION INTRAVENOUS PRN
Status: DISCONTINUED | OUTPATIENT
Start: 2023-04-07 | End: 2023-04-07 | Stop reason: SDUPTHER

## 2023-04-07 RX ADMIN — PHENYLEPHRINE HYDROCHLORIDE 100 MCG: 10 INJECTION INTRAVENOUS at 11:35

## 2023-04-07 RX ADMIN — FENTANYL CITRATE 100 MCG: 50 INJECTION, SOLUTION INTRAMUSCULAR; INTRAVENOUS at 11:15

## 2023-04-07 RX ADMIN — KETAMINE HYDROCHLORIDE 20 MG: 50 INJECTION, SOLUTION INTRAMUSCULAR; INTRAVENOUS at 11:15

## 2023-04-07 RX ADMIN — Medication 10 MG: at 12:36

## 2023-04-07 RX ADMIN — SODIUM CHLORIDE, SODIUM LACTATE, POTASSIUM CHLORIDE, AND CALCIUM CHLORIDE: 600; 310; 30; 20 INJECTION, SOLUTION INTRAVENOUS at 09:10

## 2023-04-07 RX ADMIN — HYDROMORPHONE HYDROCHLORIDE 0.5 MG: 2 INJECTION, SOLUTION INTRAMUSCULAR; INTRAVENOUS; SUBCUTANEOUS at 13:55

## 2023-04-07 RX ADMIN — SODIUM CHLORIDE, SODIUM LACTATE, POTASSIUM CHLORIDE, AND CALCIUM CHLORIDE: 600; 310; 30; 20 INJECTION, SOLUTION INTRAVENOUS at 12:17

## 2023-04-07 RX ADMIN — Medication 3 MG: at 13:11

## 2023-04-07 RX ADMIN — ROCURONIUM BROMIDE 50 MG: 50 INJECTION, SOLUTION INTRAVENOUS at 11:15

## 2023-04-07 RX ADMIN — TRANEXAMIC ACID 1000 MG: 100 INJECTION, SOLUTION INTRAVENOUS at 11:20

## 2023-04-07 RX ADMIN — PHENYLEPHRINE HYDROCHLORIDE 100 MCG: 10 INJECTION INTRAVENOUS at 11:26

## 2023-04-07 RX ADMIN — OXYCODONE 5 MG: 5 TABLET ORAL at 14:16

## 2023-04-07 RX ADMIN — GLYCOPYRROLATE 0.4 MG: 0.2 INJECTION INTRAMUSCULAR; INTRAVENOUS at 13:11

## 2023-04-07 RX ADMIN — PHENYLEPHRINE HYDROCHLORIDE 100 MCG: 10 INJECTION INTRAVENOUS at 11:25

## 2023-04-07 RX ADMIN — Medication 10 MG: at 12:09

## 2023-04-07 RX ADMIN — ACETAMINOPHEN 1000 MG: 500 TABLET, FILM COATED ORAL at 08:33

## 2023-04-07 RX ADMIN — Medication 2000 MG: at 11:20

## 2023-04-07 RX ADMIN — PROPOFOL 50 MG: 10 INJECTION, EMULSION INTRAVENOUS at 11:18

## 2023-04-07 RX ADMIN — PROPOFOL 250 MG: 10 INJECTION, EMULSION INTRAVENOUS at 11:15

## 2023-04-07 RX ADMIN — ROCURONIUM BROMIDE 20 MG: 50 INJECTION, SOLUTION INTRAVENOUS at 11:35

## 2023-04-07 RX ADMIN — PHENYLEPHRINE HYDROCHLORIDE 100 MCG: 10 INJECTION INTRAVENOUS at 11:58

## 2023-04-07 RX ADMIN — Medication 10 MG: at 11:31

## 2023-04-07 RX ADMIN — LIDOCAINE HYDROCHLORIDE 100 MG: 20 INJECTION, SOLUTION EPIDURAL; INFILTRATION; INTRACAUDAL; PERINEURAL at 11:15

## 2023-04-07 RX ADMIN — ONDANSETRON 4 MG: 2 INJECTION INTRAMUSCULAR; INTRAVENOUS at 13:45

## 2023-04-07 RX ADMIN — HYDROMORPHONE HYDROCHLORIDE 0.5 MG: 2 INJECTION, SOLUTION INTRAMUSCULAR; INTRAVENOUS; SUBCUTANEOUS at 13:45

## 2023-04-07 RX ADMIN — ONDANSETRON 4 MG: 2 INJECTION INTRAMUSCULAR; INTRAVENOUS at 13:11

## 2023-04-07 RX ADMIN — DEXAMETHASONE SODIUM PHOSPHATE 10 MG: 10 INJECTION INTRAMUSCULAR; INTRAVENOUS at 11:23

## 2023-04-07 ASSESSMENT — PAIN - FUNCTIONAL ASSESSMENT: PAIN_FUNCTIONAL_ASSESSMENT: 0-10

## 2023-04-07 ASSESSMENT — PAIN DESCRIPTION - LOCATION
LOCATION: HIP

## 2023-04-07 ASSESSMENT — PAIN DESCRIPTION - DESCRIPTORS
DESCRIPTORS: ACHING

## 2023-04-07 ASSESSMENT — PAIN SCALES - GENERAL
PAINLEVEL_OUTOF10: 5
PAINLEVEL_OUTOF10: 3
PAINLEVEL_OUTOF10: 8
PAINLEVEL_OUTOF10: 8

## 2023-04-07 ASSESSMENT — PAIN DESCRIPTION - ORIENTATION
ORIENTATION: RIGHT

## 2023-04-07 NOTE — H&P
The patient expressed understanding and elected to proceed as planned, informed consent was obtained. Jesus Lowry MD, [address] [facility]

## 2023-04-07 NOTE — ANESTHESIA PRE PROCEDURE
Allergies:  No Known Allergies    Problem List:    Patient Active Problem List   Diagnosis Code    Right hip pain M25.551    Femoral acetabular impingement M25.859       Past Medical History:        Diagnosis Date    Insomnia     melatonin       Past Surgical History:        Procedure Laterality Date    HIP ARTHROSCOPY Left 2022    Dr. Annabella Clarke History:    Social History     Tobacco Use    Smoking status: Never    Smokeless tobacco: Never   Substance Use Topics    Alcohol use: Not Currently                                Counseling given: Not Answered      Vital Signs (Current):   Vitals:    04/05/23 1526 04/07/23 0819   BP:  121/73   Pulse:  54   Resp:  20   Temp:  98.4 °F (36.9 °C)   TempSrc:  Oral   SpO2:  99%   Weight: 220 lb (99.8 kg) 220 lb 1.6 oz (99.8 kg)   Height: 6' 6\" (1.981 m)                                               BP Readings from Last 3 Encounters:   04/07/23 121/73       NPO Status: Time of last liquid consumption: 2100                        Time of last solid consumption: 2100                        Date of last liquid consumption: 04/06/23                        Date of last solid food consumption: 04/06/23    BMI:   Wt Readings from Last 3 Encounters:   04/07/23 220 lb 1.6 oz (99.8 kg)   04/04/23 220 lb (99.8 kg)   05/17/22 218 lb (98.9 kg) (97 %, Z= 1.84)*     * Growth percentiles are based on CDC (Boys, 2-20 Years) data. Body mass index is 25.44 kg/m². CBC:   Lab Results   Component Value Date/Time    HGB 14.4 05/18/2022 07:21 AM       CMP: No results found for: NA, K, CL, CO2, BUN, CREATININE, GFRAA, AGRATIO, LABGLOM, GLUCOSE, GLU, PROT, CALCIUM, BILITOT, ALKPHOS, AST, ALT    POC Tests: No results for input(s): POCGLU, POCNA, POCK, POCCL, POCBUN, POCHEMO, POCHCT in the last 72 hours.     Coags: No results found for: PROTIME, INR, APTT    HCG (If Applicable): No results found for: PREGTESTUR, PREGSERUM, HCG, HCGQUANT     ABGs: No

## 2023-04-07 NOTE — ANESTHESIA POSTPROCEDURE EVALUATION
Department of Anesthesiology  Postprocedure Note    Patient: Diana Martinez  MRN: [id number]  YOB: 2000  Date of evaluation: 4/7/2023      Procedure Summary     Date: 04/07/23 Room / Location: [facility] OP OR 03 / [facility]    Anesthesia Start: 1105 Anesthesia Stop: 1341    Procedure: RIGHT HIP ARTHROSCOPY FEMOROPLASTY, ACETABULOPLASTY AND LABRAL REPAIR (Right: Hip) Diagnosis:       Right hip pain      Femoral acetabular impingement      (Right hip pain [M25.551])      (Femoral acetabular impingement [M25.859])    Surgeons: Mylene Pozo MD Responsible Provider: Hiral Hardy MD    Anesthesia Type: General ASA Status: 1          Anesthesia Type: General    Letty Phase I: Letty Score: 7    Letty Phase II:        Anesthesia Post Evaluation    Patient location during evaluation: PACU  Patient participation: complete - patient participated  Level of consciousness: awake and alert  Airway patency: patent  Nausea & Vomiting: no nausea  Cardiovascular status: hemodynamically stable  Respiratory status: acceptable  Hydration status: euvolemic

## 2023-04-07 NOTE — DISCHARGE INSTRUCTIONS
normal. If they become     EXERCISES: On Day 1 if pain is well controlled or Day 2 :   Begin passive circumduction exercises. 5 minutes clockwise, and 5 minutes counter-clockwise without the hip flexed (lying flat). 5 minutes clockwise and 5 min counter-clockwise with the hip flexed approximately 70 deg and the knee flexed 90 deg. (20 minutes total combined). The motion should be done by a family member or care giver only, do not try to move the hip yourself. YOU DO NOT NEED TO DO THIS IF YOU ARE USING THE CPM MACHINE. CPM machine (continuous passive motion): If you have this machine for postoperative use. You may begin using this on postoperative day 1 or 2 if pain is controlled. Begin range of motion from 30 to 70 degrees of flexion. Goal of 4 hours a day in the motion machine that can be split up in multiple sessions. You may advance 10 degrees at a time in each direction as your comfort and pain allows and your motion improves. Continue this daily until your follow up appointment. Icing: Continue to use the ice machine frequently. Third Post-OP Day:  MEDICATION: continue as instructed above  BANDAGES (after 72 hours): remove outer bandages, leave steri-strips in place. You may shower, but keep the incisions as dry as possible (cover with plastic wrap). It is best to sit down in the shower to avoid slipping. Do not flex the hip past 90 degrees while in the shower. EXERCISES: continue as above. ICE: continue to ice frequently either with the ice machine or regular ice pack. Do not put it directly on your skin, place a thin layer of clothing or towel. Ice for 20-30 minutes on, then 20-30 minutes off. Physical Therapy  You should have a PT visit scheduled within 3-5 days after your surgery to begin motion exercises and instruct you on exercises at home. If you have not been contacted by day 2 after surgery then call the office to request therapy being scheduled. Hip Response to Surgery:  Your

## 2023-04-07 NOTE — OP NOTE
capsule for postoperative pain control. Skin was closed with buried Monocryl suture and Steri-Strips and a sterile dressing and cryotherapy device was applied.   Joby tolerated the procedure well was awakened and transferred to the PACU in stable condition    Postoperative Plan  1) Discharge home  2) HO prophylaxis per protocol with Indocin and Mobic  3) Hip arthroscopy labral repair protocol    Signed By:  Hailey Conde MD     April 7, 2023

## 2023-04-07 NOTE — PERIOP NOTE
Discharge instructions reviewed with pt and family member who verbalize understanding of follow up care.
Patient asked for mother to be called to do phone PAT; Lindy Terrell mother verified patient name and . Order for consent NOT found in EHR; patient mother verifies procedure from case posting. Type 1B surgery, phone assessment complete. Orders NOT received. Labs per surgeon: Unknown  Labs per anesthesia protocol: None per protocol    Patient answered medical/surgical history questions at their best of ability. All prior to admission medications documented in [facility]. If you have not heard from Alec Corrales by 2 pm, please call [phone]. Patient instructed to take the following medications the day of surgery according to anesthesia guidelines with a small sip of water: NONE. On the day before surgery please take Acetaminophen 1000mg in the morning and then again before bed. You may substitute for Tylenol 650 mg. Hold all vitamins 7 days prior to surgery and NSAIDS 5 days prior to surgery. Prescription meds to hold: NONE    Patient instructed on the following:    > Arrive at [facility], time of arrival to be called the day before by 1700  > NPO after midnight, unless otherwise indicated, including gum, mints, and ice chips  > Responsible adult must drive patient to the hospital, stay during surgery, and patient will need supervision 24 hours after anesthesia  > Use antibacterial soap in shower the night before surgery and on the morning of surgery  > All piercings must be removed prior to arrival.    > Leave all valuables (money and jewelry) at home but bring insurance card and ID on DOS.   > You may be required to pay a deductible or co-pay on the day of your procedure. You can pre-pay by calling [phone] if your surgery is at the [facility] or [phone] if your surgery is at the [facility]. > Do not wear make-up, nail polish, lotions, cologne, perfumes, powders, or oil on skin. Artificial nails are not permitted.
Preop department called to notify patient of arrival time for scheduled procedure. Instructions given to   - Arrive at [address] - Remain NPO after midnight, unless otherwise indicated, including gum, mints, and ice chips. - Have a responsible adult to drive patient to the hospital, stay during surgery, and patient will need supervision 24 hours after anesthesia. - Use antibacterial soap in shower the night before surgery and on the morning of surgery.        Was patient contacted: yes  Voicemail left:   Numbers contacted: [phone]   Arrival time: 0800
Preop department called to notify patient of arrival time for scheduled procedure. Instructions given to   - Arrive at [address]. - Remain NPO after midnight, unless otherwise indicated, including gum, mints, and ice chips. - Have a responsible adult to drive patient to the hospital, stay during surgery, and patient will need supervision 24 hours after anesthesia. - Use antibacterial soap in shower the night before surgery and on the morning of surgery.        Was patient contacted: no  Voicemail left: yes on patients and mothers cell phone  Numbers contacted: [phone]  Arrival time: 0800
must be removed prior to arrival.  If you wear contacts then you will need to bring a case to store them in or wear your glasses. Artificial nails are not permitted. Please leave all your valuables at home but be sure to bring your insurance card and ID on the day of surgery for registration/identification. Our Guide to Surgery with additional information can be found:  [email or web address]    Emailed to: [email or web address]

## 2023-04-07 NOTE — ANESTHESIA PROCEDURE NOTES
Airway  Date/Time: 4/7/2023 11:19 AM  Urgency: elective    Airway not difficult    General Information and Staff    Patient location during procedure: OR  Resident/CRNA: EARL Sykes CRNA  Performed: resident/CRNA     Indications and Patient Condition  Indications for airway management: anesthesia and airway protection  Spontaneous Ventilation: absent  Sedation level: deep  Preoxygenated: yes  Patient position: sniffing  MILS maintained throughout  Mask difficulty assessment: vent by bag mask + OA or adjuvant +/- NMBA    Final Airway Details  Final airway type: endotracheal airway      Successful airway: ETT  Cuffed: yes   Facilitating devices/methods: intubating stylet  Endotracheal tube insertion site: oral  Blade: Aniya  Blade size: #4  ETT size (mm): 8.0  Placement verified by: chest auscultation and capnometry   Measured from: teeth  ETT to teeth (cm): 26  Number of attempts at approach: 1  Ventilation between attempts: bag mask  Number of other approaches attempted: 0    Additional Comments  PreO2 > 3 minutes. Standard IV induction by MDA. DL x 1 attempt. Atraumatic insertion. Positive equal and bilateral breath sounds noted with positive ETCO2 present. Dentition unchanged from pre-op.    no

## 2023-04-14 ENCOUNTER — HOSPITAL ENCOUNTER (OUTPATIENT)
Dept: PHYSICAL THERAPY | Age: 23
Setting detail: RECURRING SERIES
Discharge: HOME | End: 2023-04-17
Payer: COMMERCIAL

## 2023-04-14 PROCEDURE — 97110 THERAPEUTIC EXERCISES: CPT

## 2023-04-18 ENCOUNTER — APPOINTMENT (OUTPATIENT)
Dept: PHYSICAL THERAPY | Age: 23
End: 2023-04-18
Payer: COMMERCIAL

## 2023-04-20 ENCOUNTER — HOSPITAL ENCOUNTER (OUTPATIENT)
Dept: PHYSICAL THERAPY | Age: 23
Setting detail: RECURRING SERIES
Discharge: HOME | End: 2023-04-23
Payer: COMMERCIAL

## 2023-04-20 PROCEDURE — 97110 THERAPEUTIC EXERCISES: CPT

## 2023-04-21 ENCOUNTER — HOSPITAL ENCOUNTER (OUTPATIENT)
Dept: PHYSICAL THERAPY | Age: 23
Setting detail: RECURRING SERIES
Discharge: HOME | End: 2023-04-24
Payer: COMMERCIAL

## 2023-04-21 PROCEDURE — 97016 VASOPNEUMATIC DEVICE THERAPY: CPT

## 2023-04-21 PROCEDURE — 97110 THERAPEUTIC EXERCISES: CPT

## 2023-04-26 ENCOUNTER — HOSPITAL ENCOUNTER (OUTPATIENT)
Dept: PHYSICAL THERAPY | Age: 23
Setting detail: RECURRING SERIES
Discharge: HOME | End: 2023-04-29
Payer: COMMERCIAL

## 2023-04-26 PROCEDURE — 97110 THERAPEUTIC EXERCISES: CPT

## 2023-04-26 NOTE — PROGRESS NOTES
coordination. Required minimal visual, verbal, manual and tactile cues to improve independence and safety with daily activities . THERAPEUTIC EXERCISE: (see below for minutes): Exercises per grid below to improve mobility, strength, balance and coordination. Required minimal verbal and manual cues to promote proper body alignment, promote proper body posture and promote proper body mechanics. Progressed resistance, range, repetitions and complexity of movement as indicated. MANUAL THERAPY: (see below for minutes): Joint mobilization and Soft tissue mobilization was utilized and necessary because of the patient's restricted joint motion, painful spasm, loss of articular motion and restricted motion of soft tissue. MODALITIES: (see below for minutes): to decrease pain   SELF CARE: (see below for minutes): Procedure(s) (per grid) utilized to improve and/or restore self-care/home management as related to dressing, bathing and grooming. Required minimal verbal cueing to facilitate activities of daily living skills and compensatory activities    Date: 4/10/23 4/12/23 4/14/23 4-20-23 4-21-23 4/26/23   Modalities:            Ice to anterior hip x 10 min  Ice to anterior hip x 10 min  Repeat  Repeat  Ice to anterior hip x 10 min                      Therapeutic Exercise: 25 min  45 min  45 min  45 mins  45 mins  40 min    Quad set [id number] Log roll into hip IR 5 min; gr 2 long axis hip distraction 30x Log roll into hip IR 5 min  Repeat  Repeat  Bike x 5 min    Glute set [id number] repeat Therapist assisted cornell stretch [id number] Repeat  Repeat  Stool rotations into IR 20x   Hamstring set [id number] repeat Bridge 3x15 3x20 bridging  Repeat wth blue tband Therapist assisted stretching with LAD, hip extension, log roll into IR   Isometric trunk activation 12o76gqx repeat Standing stool rotations into IR 30x Repeat  Repeat  Bridge 30x    Log roll into hip IR gr 3 to 3+  Bike AAROM x6 min Repeat  Repeat  Bent knee fall outs 30x

## 2023-04-28 ENCOUNTER — HOSPITAL ENCOUNTER (OUTPATIENT)
Dept: PHYSICAL THERAPY | Age: 23
Setting detail: RECURRING SERIES
Discharge: HOME | End: 2023-05-01
Payer: COMMERCIAL

## 2023-04-28 PROCEDURE — 97110 THERAPEUTIC EXERCISES: CPT

## 2023-05-02 ENCOUNTER — HOSPITAL ENCOUNTER (OUTPATIENT)
Dept: PHYSICAL THERAPY | Age: 23
Setting detail: RECURRING SERIES
Discharge: HOME | End: 2023-05-05
Payer: COMMERCIAL

## 2023-05-02 ENCOUNTER — OFFICE VISIT (OUTPATIENT)
Dept: ORTHOPEDIC SURGERY | Age: 23
End: 2023-05-02

## 2023-05-02 DIAGNOSIS — M25.859 FEMORAL ACETABULAR IMPINGEMENT: ICD-10-CM

## 2023-05-02 DIAGNOSIS — Z09 S/P ORTHOPEDIC SURGERY, FOLLOW-UP EXAM: Primary | ICD-10-CM

## 2023-05-02 PROCEDURE — 97110 THERAPEUTIC EXERCISES: CPT

## 2023-05-02 PROCEDURE — 99024 POSTOP FOLLOW-UP VISIT: CPT | Performed by: ORTHOPAEDIC SURGERY

## 2023-05-02 NOTE — PROGRESS NOTES
log roll into IR Therapist assisted stretching with LAD  Bent knee fall outs 20x   Isometric trunk activation [id number] repeat Standing stool rotations into IR 30x Repeat  Repeat  Bridge 30x Bent knee fall outs 30x Bridge 10x DL; 25a24nbr with kick out    Log roll into hip IR gr 3 to 3+  Bike AAROM x6 min Repeat  Repeat  Bent knee fall outs 30x Bridge 10x DL, with kick out 59w6wqt Hip circuit: 3x10: clamshell, rev clamshell, hip abduction     Hip abduction isometrics 83x48sjz Isometrics: quad set, glute set, hip extension, trunk activation 50u13yge ea 40 reps x 3SH  adding in hamstring iso  Repeat  SL clamshell 3x10 SL clamshell, rev clamshell, hip abduction 3x10 ea Walking march x 1 lap, loaded carry with march 35# in LUE x 2 laps     Hip adduction isometrics 31x10jli Pelvic tilts 30x Repeat pelvic tilts and adduction isometrics  Repeat  Tuan curl ups 3x15 1/4 squats cueing wt shift 30x Goblet squat 1/4 20# 3x15     Pelvic tilts 20x [facility] curl ups 30x Repeat  Repeat  Isometric hip extension [id number] Reverse hypers 4x10 Step ups 10\" 30x to single leg stance     Passive stretching into hip flexion limited to 90 ° 10x   Using SB x 40  Isometric hip ab and adduction 50u22hbs ea  Kneeling hip flexor stretch 55v53eaw with RLE on airex      Bridging 3x10              Clamshells 3x15             HS stretch with strap to 90 ° 4x15SH       SAQ     X40 no weight                             Manual Therapy:                                 Functional Activities:                                                           Treatment/Session Summary:    >Treatment Assessment:    Symptoms continue to decrease to allow for discontinuation of crutches. Communication/Consultation:  None today  Equipment provided today:  None  Recommendations/Intent for next treatment session: Next visit will focus on progression of strength and return to prior level of function.     >Total Treatment Billable Duration:  55 minutes  Time In: 1530  Time

## 2023-05-02 NOTE — PROGRESS NOTES
Name: Ping Naranjo  YOB: 2000  Gender: male  MRN: [id number]    Procedure Performed:   1) right  Hip arthroscopy with labral repair  2) right Hip arthroscopy with Cam femoroplasty  3) right  hip arthroscopy with rim trimming acetabuloplasty       Date of Procedure: 4/7/2023     Subjective: He is 4 weeks s/p of the above procedure and progressing well with no concerns or complaints at this time. Physical Examination:Incisions clean dry and intact, no sign of infection. Motor and sensory intact. Flexion 100 circumduction without pain. Assessment:   1. S/P orthopedic surgery, follow-up exam    2. Femoral acetabular impingement         Plan:   Doing well. .   Continue PT per hip arthroscopy labral repair protocol.  Progress WBAT  Follow up in 4 weeks

## 2023-05-04 ENCOUNTER — HOSPITAL ENCOUNTER (OUTPATIENT)
Dept: PHYSICAL THERAPY | Age: 23
Setting detail: RECURRING SERIES
Discharge: HOME | End: 2023-05-07
Payer: COMMERCIAL

## 2023-05-04 PROCEDURE — 97110 THERAPEUTIC EXERCISES: CPT

## 2023-05-05 ENCOUNTER — HOSPITAL ENCOUNTER (OUTPATIENT)
Dept: PHYSICAL THERAPY | Age: 23
Setting detail: RECURRING SERIES
Discharge: HOME | End: 2023-05-08
Payer: COMMERCIAL

## 2023-05-05 PROCEDURE — 97110 THERAPEUTIC EXERCISES: CPT

## 2023-05-09 ENCOUNTER — HOSPITAL ENCOUNTER (OUTPATIENT)
Dept: PHYSICAL THERAPY | Age: 23
Setting detail: RECURRING SERIES
Discharge: HOME | End: 2023-05-12
Payer: COMMERCIAL

## 2023-05-09 PROCEDURE — 97110 THERAPEUTIC EXERCISES: CPT

## 2023-05-09 NOTE — PROGRESS NOTES
Stool rotations into ER and IR 20x Stool rotations into IR 20x Stool rotations into IR 20x Therapist assisted stretching into IR   Hamstring set [id number] repeat Bridge 3x15 3x20 bridging  Repeat wth blue tband Therapist assisted stretching with LAD, hip extension, log roll into IR Therapist assisted stretching with LAD  Bent knee fall outs 20x Bent knee fall outs 20x Bent knee fall outs 10x, CLAUDIA therapist assisted [id number] Bridge with kick out 15k93bsx   Isometric trunk activation 90u50exb repeat Standing stool rotations into IR 30x Repeat  Repeat  Bridge 30x Bent knee fall outs 30x Bridge 10x DL; 58d38dbh with kick out Therapist assisted CLAUDIA stretch 9r30eya MWM into IR 10x Hip circuit: clamshell, rev clamshell, hip IR 3x15 ea     Log roll into hip IR gr 3 to 3+  Bike AAROM x6 min Repeat  Repeat  Bent knee fall outs 30x Bridge 10x DL, with kick out 25u7ekb Hip circuit: 3x10: clamshell, rev clamshell, hip abduction Bridge with kick out 51z00nqm repeat Side planks 10x10 sec ea side     Hip abduction isometrics 36b98vkt Isometrics: quad set, glute set, hip extension, trunk activation [id number] ea 40 reps x 3SH  adding in hamstring iso  Repeat  SL clamshell 3x10 SL clamshell, rev clamshell, hip abduction 3x10 ea Walking march x 1 lap, loaded carry with march 35# in LUE x 2 laps Hip circuit 3x15: clamshell, rev clamshell, hip abduction ea repeat Reverse lunge with slider 4x8 to 50% of range     Hip adduction isometrics 40j16cwg Pelvic tilts 30x Repeat pelvic tilts and adduction isometrics  Repeat  Tuan curl ups 3x15 1/4 squats cueing wt shift 30x Goblet squat 1/4 20# 3x15 Loaded carry 35# x2 laps Kneeling hip flexor stretch 01b84qna  Bball toss with SLS on MOBO 3x1 min      Pelvic tilts 20x [facility] curl ups 30x Repeat  Repeat  Isometric hip extension 46s94qlz Reverse hypers 4x10 Step ups 10\" 30x to single leg stance Goblet squat 35# 4x8 Qped hip extension 3x15      Passive stretching into hip flexion limited to 90 °

## 2023-05-11 ENCOUNTER — HOSPITAL ENCOUNTER (OUTPATIENT)
Dept: PHYSICAL THERAPY | Age: 23
Setting detail: RECURRING SERIES
Discharge: HOME | End: 2023-05-14
Payer: COMMERCIAL

## 2023-05-11 PROCEDURE — 97110 THERAPEUTIC EXERCISES: CPT

## 2023-05-12 ENCOUNTER — APPOINTMENT (OUTPATIENT)
Dept: PHYSICAL THERAPY | Age: 23
End: 2023-05-12
Payer: COMMERCIAL

## 2023-05-16 ENCOUNTER — HOSPITAL ENCOUNTER (OUTPATIENT)
Dept: PHYSICAL THERAPY | Age: 23
Setting detail: RECURRING SERIES
Discharge: HOME | End: 2023-05-19
Payer: COMMERCIAL

## 2023-05-16 PROCEDURE — 97110 THERAPEUTIC EXERCISES: CPT

## 2023-05-16 NOTE — PROGRESS NOTES
Wilber Cardenas  : 2000  Primary: [employer] Benefits ([place] [employer])  Secondary: Pako Rodriguez @ [address] [facility] [address]  Phone: [phone]  Fax: [phone] Plan Frequency: 3x/week for 12 weeks  Plan of Care/Certification Expiration Date: 23      >PT Visit Info:  Plan Frequency: 3x/week for 12 weeks  Plan of Care/Certification Expiration Date: 23      Visit Count:  11    OUTPATIENT PHYSICAL THERAPY:OP NOTE TYPE: Treatment Note/Progress Note 2023       Episode  }Appt Desk             Treatment Diagnosis:  Pain in Right Hip (M25.551)  Stiffness of Right Hip, Not elsewhere classified (M25.651)  Medical/Referring Diagnosis:  Pain in right hip [M25.551]  Other specified joint disorders, unspecified hip [M25.859]  Referring Physician:  Meenu Watson MD MD Orders:  PT Eval and Treat   Date of Onset:  Onset Date: 23 (s/p right hip labral repair)     Allergies:   Patient has no known allergies. Restrictions/Precautions:  Restrictions/Precautions: -- (per surgical precautions)  No data recorded   Interventions Planned (Treatment may consist of any combination of the following):    Current Treatment Recommendations: Strengthening; ROM; Balance training; Functional mobility training; Transfer training; ADL/Self-care training; Endurance training; Gait training; Stair training; Neuromuscular re-education; Manual; Pain management; Return to work related activity; Home exercise program; Patient/Caregiver education & training; Modalities; Therapeutic activities     >Subjective Comments:    Feels like the hip is continuing to improve. Soreness is decreasing with decreasing popping noted. >Initial:      0/10>Post Session:        0/10  Medications Last Reviewed:  2023  Updated Objective Findings:  None Today  Treatment   TREATMENT:   THERAPEUTIC ACTIVITY: ( see below for minutes):  Therapeutic activities per grid below to improve

## 2023-05-19 ENCOUNTER — HOSPITAL ENCOUNTER (OUTPATIENT)
Dept: PHYSICAL THERAPY | Age: 23
Setting detail: RECURRING SERIES
Discharge: HOME | End: 2023-05-22
Payer: COMMERCIAL

## 2023-05-19 PROCEDURE — 97110 THERAPEUTIC EXERCISES: CPT

## 2023-05-23 ENCOUNTER — HOSPITAL ENCOUNTER (OUTPATIENT)
Dept: PHYSICAL THERAPY | Age: 23
Setting detail: RECURRING SERIES
Discharge: HOME | End: 2023-05-26
Payer: COMMERCIAL

## 2023-05-23 PROCEDURE — 97110 THERAPEUTIC EXERCISES: CPT

## 2023-05-23 NOTE — PROGRESS NOTES
Zion Ramos  : 2000  Primary: [employer] Benefits ([place] [employer])  Secondary: Pako Rodriguez @ [address] [facility] [address]  Phone: [phone]  Fax: [phone] Plan Frequency: 3x/week for 12 weeks  Plan of Care/Certification Expiration Date: 23      >PT Visit Info:  Plan Frequency: 3x/week for 12 weeks  Plan of Care/Certification Expiration Date: 23      Visit Count:  13    OUTPATIENT PHYSICAL THERAPY:OP NOTE TYPE: Treatment Note 2023       Episode  }Appt Desk             Treatment Diagnosis:  Pain in Right Hip (M25.551)  Stiffness of Right Hip, Not elsewhere classified (M25.651)  Medical/Referring Diagnosis:  Pain in right hip [M25.551]  Other specified joint disorders, unspecified hip [M25.859]  Referring Physician:  Carol Armstrong MD MD Orders:  PT Eval and Treat   Date of Onset:  Onset Date: 23 (s/p right hip labral repair)     Allergies:   Patient has no known allergies. Restrictions/Precautions:  Restrictions/Precautions: -- (per surgical precautions)  No data recorded   Interventions Planned (Treatment may consist of any combination of the following):    Current Treatment Recommendations: Strengthening; ROM; Balance training; Functional mobility training; Transfer training; ADL/Self-care training; Endurance training; Gait training; Stair training; Neuromuscular re-education; Manual; Pain management; Return to work related activity; Home exercise program; Patient/Caregiver education & training; Modalities; Therapeutic activities     >Subjective Comments:    Notes it feels like it's healing a bit slower, but feels like he's continuing to improve.      >Initial:      0/10>Post Session:        0/10  Medications Last Reviewed:  2023  Updated Objective Findings:      Goals: (Goals have been discussed and agreed upon with patient.)  Short-Term Functional Goals: Time Frame: 6 weeks  MET  Patient will be independent

## 2023-05-25 ENCOUNTER — HOSPITAL ENCOUNTER (OUTPATIENT)
Dept: PHYSICAL THERAPY | Age: 23
Setting detail: RECURRING SERIES
Discharge: HOME | End: 2023-05-28
Payer: COMMERCIAL

## 2023-05-25 PROCEDURE — 97110 THERAPEUTIC EXERCISES: CPT

## 2023-05-26 ENCOUNTER — HOSPITAL ENCOUNTER (OUTPATIENT)
Dept: PHYSICAL THERAPY | Age: 23
Setting detail: RECURRING SERIES
End: 2023-05-26
Payer: COMMERCIAL

## 2023-05-30 ENCOUNTER — HOSPITAL ENCOUNTER (OUTPATIENT)
Dept: PHYSICAL THERAPY | Age: 23
Setting detail: RECURRING SERIES
Discharge: HOME | End: 2023-06-02
Payer: COMMERCIAL

## 2023-05-30 ENCOUNTER — OFFICE VISIT (OUTPATIENT)
Dept: ORTHOPEDIC SURGERY | Age: 23
End: 2023-05-30

## 2023-05-30 DIAGNOSIS — Z09 S/P ORTHOPEDIC SURGERY, FOLLOW-UP EXAM: Primary | ICD-10-CM

## 2023-05-30 PROCEDURE — 97110 THERAPEUTIC EXERCISES: CPT

## 2023-05-30 NOTE — PROGRESS NOTES
Florencia Cloud  : 2000  Primary: [employer] Benefits ([place] [employer])  Secondary: Pako Rodriguez @ [facility] [address] [address]  Phone: [phone]  Fax: [phone] Plan Frequency: 3x/week for 12 weeks  Plan of Care/Certification Expiration Date: 23      >PT Visit Info:  Plan Frequency: 3x/week for 12 weeks  Plan of Care/Certification Expiration Date: 23      Visit Count:  15    OUTPATIENT PHYSICAL THERAPY:OP NOTE TYPE: Treatment Note 2023       Episode  }Appt Desk             Treatment Diagnosis:  Pain in Right Hip (M25.551)  Stiffness of Right Hip, Not elsewhere classified (M25.651)  Medical/Referring Diagnosis:  Pain in right hip [M25.551]  Other specified joint disorders, unspecified hip [M25.859]  Referring Physician:  Fadi Greco MD MD Orders:  PT Eval and Treat   Date of Onset:  Onset Date: 23 (s/p right hip labral repair)     Allergies:   Patient has no known allergies. Restrictions/Precautions:  Restrictions/Precautions: -- (per surgical precautions)  No data recorded   Interventions Planned (Treatment may consist of any combination of the following):    Current Treatment Recommendations: Strengthening; ROM; Balance training; Functional mobility training; Transfer training; ADL/Self-care training; Endurance training; Gait training; Stair training; Neuromuscular re-education; Manual; Pain management; Return to work related activity; Home exercise program; Patient/Caregiver education & training; Modalities; Therapeutic activities     >Subjective Comments:       Had follow up with MD and discussed continued irritation of the anterior hip/hip flexor.    >Initial:      0/10>Post Session:        0/10  Medications Last Reviewed:  2023  Updated Objective Findings:      Goals: (Goals have been discussed and agreed upon with patient.)  Short-Term Functional Goals: Time Frame: 6 weeks  MET  Patient will be independent

## 2023-05-30 NOTE — PROGRESS NOTES
Name: Tiago Moise  YOB: 2000  Gender: male  MRN: [id number]    Procedure Performed:   1) right  Hip arthroscopy with labral repair  2) right Hip arthroscopy with Cam femoroplasty  3) right  hip arthroscopy with rim trimming acetabuloplasty       Date of Procedure: 04/07/2023      Subjective:Returns 8 weeks status post the above procedure. Doing very well just having some occasional hip flexor symptoms. He is ambulating well off crutches      Physical Examination:Incisions clean dry and intact, no sign of infection. Motor and sensory intact. No pain with logroll flexion past 90 internal about 15-20 external to 40 without pain. Assessment:   1. S/P orthopedic surgery, follow-up exam         Plan:   Doing well. We reviewed appropriate precautions. We can progress to some on court ball handling and shooting activities with no change of direction or impact activity.   Continue PT per hip arthroscopy protocol  Follow up in 4-6 weeks

## 2023-05-31 ENCOUNTER — HOSPITAL ENCOUNTER (OUTPATIENT)
Dept: PHYSICAL THERAPY | Age: 23
Setting detail: RECURRING SERIES
Discharge: HOME | End: 2023-06-03
Payer: COMMERCIAL

## 2023-05-31 PROCEDURE — 97110 THERAPEUTIC EXERCISES: CPT

## 2023-05-31 NOTE — PROGRESS NOTES
Info  [facility] Portal  MD Guidelines  Scanned Media  Benefits  MyChart    Future Appointments   Date Time Provider Killian Zimmer   6/5/2023  2:45 PM Emaline Gouty, PT [facility]   6/9/2023  2:00 PM Emaline Gouty, PT [facility]   6/12/2023  3:30 PM Emaline Gouty, PT [facility]   6/16/2023  2:00 PM Emaline Gouty, PT [facility]   6/19/2023  2:45 PM Emaline Gouty, PT [facility]   6/23/2023  2:00 PM Emaline Gouty, PT [facility]   6/26/2023  2:45 PM Emaline Gouty, PT [facility]   6/28/2023  2:00 PM Emaline Gouty, PT [facility]

## 2023-06-01 ENCOUNTER — APPOINTMENT (OUTPATIENT)
Dept: PHYSICAL THERAPY | Age: 23
End: 2023-06-01
Payer: COMMERCIAL

## 2023-06-02 ENCOUNTER — APPOINTMENT (OUTPATIENT)
Dept: PHYSICAL THERAPY | Age: 23
End: 2023-06-02
Payer: COMMERCIAL

## 2023-06-05 ENCOUNTER — HOSPITAL ENCOUNTER (OUTPATIENT)
Dept: PHYSICAL THERAPY | Age: 23
Setting detail: RECURRING SERIES
Discharge: HOME | End: 2023-06-08
Payer: COMMERCIAL

## 2023-06-05 PROCEDURE — 97110 THERAPEUTIC EXERCISES: CPT

## 2023-06-05 NOTE — PROGRESS NOTES
Zack Albarran  : 2000  Primary: [employer] Benefits ([place] [employer])  Secondary: Pako Rodriguez @ [address] [place] [address]  Phone: [phone]  Fax: [phone] Plan Frequency: 3x/week for 12 weeks  Plan of Care/Certification Expiration Date: 23      >PT Visit Info:  Plan Frequency: 3x/week for 12 weeks  Plan of Care/Certification Expiration Date: 23      Visit Count:  17    OUTPATIENT PHYSICAL THERAPY:OP NOTE TYPE: Treatment Note 2023       Episode  }Appt Desk             Treatment Diagnosis:  Pain in Right Hip (M25.551)  Stiffness of Right Hip, Not elsewhere classified (M25.651)  Medical/Referring Diagnosis:  Pain in right hip [M25.551]  Other specified joint disorders, unspecified hip [M25.859]  Referring Physician:  Bishop Vega MD MD Orders:  PT Eval and Treat   Date of Onset:  Onset Date: 23 (s/p right hip labral repair)     Allergies:   Patient has no known allergies. Restrictions/Precautions:  Restrictions/Precautions: -- (per surgical precautions)  No data recorded   Interventions Planned (Treatment may consist of any combination of the following):    Current Treatment Recommendations: Strengthening; ROM; Balance training; Functional mobility training; Transfer training; ADL/Self-care training; Endurance training; Gait training; Stair training; Neuromuscular re-education; Manual; Pain management; Return to work related activity; Home exercise program; Patient/Caregiver education & training; Modalities; Therapeutic activities     >Subjective Comments: The pain is feeling a lot better. I've been doing a lot of stretching and it seems to be calming down.   >Initial:      0/10>Post Session:        0/10  Medications Last Reviewed:  2023  Updated Objective Findings:      Goals: (Goals have been discussed and agreed upon with patient.)  Short-Term Functional Goals: Time Frame: 6 weeks  MET  Patient will be

## 2023-06-07 ENCOUNTER — HOSPITAL ENCOUNTER (OUTPATIENT)
Dept: PHYSICAL THERAPY | Age: 23
Setting detail: RECURRING SERIES
Discharge: HOME | End: 2023-06-10
Payer: COMMERCIAL

## 2023-06-07 PROCEDURE — 97110 THERAPEUTIC EXERCISES: CPT

## 2023-06-07 NOTE — PROGRESS NOTES
weeks  MET  Patient will be independent with HEP. Patient will demonstrate sufficient healing to discontinue use of crutches for community mobility. Discharge Goals: Time Frame: 16 weeks ONGOING  Patient will report no pain with daily activity. Patient will demonstrate 5/5 strength with single leg squat to initiate return to functional sport progression. Patient will demonstrate >90% symmetry on single and triple hop testing to return to functional sport progression. Treatment   TREATMENT:   THERAPEUTIC ACTIVITY: ( see below for minutes): Therapeutic activities per grid below to improve mobility, strength, balance and coordination. Required minimal visual, verbal, manual and tactile cues to improve independence and safety with daily activities . THERAPEUTIC EXERCISE: (see below for minutes): Exercises per grid below to improve mobility, strength, balance and coordination. Required minimal verbal and manual cues to promote proper body alignment, promote proper body posture and promote proper body mechanics. Progressed resistance, range, repetitions and complexity of movement as indicated. MANUAL THERAPY: (see below for minutes): Joint mobilization and Soft tissue mobilization was utilized and necessary because of the patient's restricted joint motion, painful spasm, loss of articular motion and restricted motion of soft tissue. MODALITIES: (see below for minutes): to decrease pain   SELF CARE: (see below for minutes): Procedure(s) (per grid) utilized to improve and/or restore self-care/home management as related to dressing, bathing and grooming. Required minimal verbal cueing to facilitate activities of daily living skills and compensatory activities    Date: 5/16/23 PN 5/19/23 5/23/23 5/25/23 5/30/23 5/31/23 6/5/23 6/7/23   Modalities:             Ice to anterior hip x 10 min  Ice to anterior hip x 10 min  Ice to anterior hip x 10 min  Ice to anterior hip x10 min  Ice to anterior hip x 10 min  Ice to

## 2023-06-16 ENCOUNTER — HOSPITAL ENCOUNTER (OUTPATIENT)
Dept: PHYSICAL THERAPY | Age: 23
Setting detail: RECURRING SERIES
Discharge: HOME | End: 2023-06-19
Payer: COMMERCIAL

## 2023-06-16 PROCEDURE — 97110 THERAPEUTIC EXERCISES: CPT

## 2023-06-19 ENCOUNTER — HOSPITAL ENCOUNTER (OUTPATIENT)
Dept: PHYSICAL THERAPY | Age: 23
Setting detail: RECURRING SERIES
Discharge: HOME | End: 2023-06-22
Payer: COMMERCIAL

## 2023-06-19 PROCEDURE — 97110 THERAPEUTIC EXERCISES: CPT

## 2023-06-19 NOTE — PROGRESS NOTES
planks [id number]      Lateral plank 3x45 sec B                                                 SAQ                              Manual Therapy:                              Functional Activities:                                                      Treatment/Session Summary:    >Treatment Assessment:    Able to perform [place]enh[place] plank without assistance from down leg due to improved hip adductor strength. Communication/Consultation:  None today  Equipment provided today:  None  Recommendations/Intent for next treatment session: Next visit will focus on progression of strength and return to prior level of function.     >Total Treatment Billable Duration:  55 minutes  Time In: 1445  Time Out: [address]  }Post Session Pain  PT Visit Info  [facility] Portal  MD Guidelines  Scanned Media  Benefits  MyChart    Future Appointments   Date Time Provider Killian Zimmer   6/23/2023  2:00 PM [facility]   6/26/2023  2:45 PM Ariana Head PT [facility]   6/28/2023  2:00 PM Ariana Head PT [facility]

## 2023-06-23 ENCOUNTER — HOSPITAL ENCOUNTER (OUTPATIENT)
Dept: PHYSICAL THERAPY | Age: 23
Setting detail: RECURRING SERIES
Discharge: HOME | End: 2023-06-26
Payer: COMMERCIAL

## 2023-06-23 PROCEDURE — 97110 THERAPEUTIC EXERCISES: CPT

## 2023-06-26 ENCOUNTER — HOSPITAL ENCOUNTER (OUTPATIENT)
Dept: PHYSICAL THERAPY | Age: 23
Setting detail: RECURRING SERIES
Discharge: HOME | End: 2023-06-29
Payer: COMMERCIAL

## 2023-06-26 PROCEDURE — 97110 THERAPEUTIC EXERCISES: CPT

## 2023-06-28 ENCOUNTER — HOSPITAL ENCOUNTER (OUTPATIENT)
Dept: PHYSICAL THERAPY | Age: 23
Setting detail: RECURRING SERIES
Discharge: HOME | End: 2023-07-01
Payer: COMMERCIAL

## 2023-06-28 PROCEDURE — 97110 THERAPEUTIC EXERCISES: CPT

## 2023-07-03 ENCOUNTER — HOSPITAL ENCOUNTER (OUTPATIENT)
Dept: PHYSICAL THERAPY | Age: 23
Setting detail: RECURRING SERIES
End: 2023-07-03
Payer: COMMERCIAL

## 2023-07-05 ENCOUNTER — HOSPITAL ENCOUNTER (OUTPATIENT)
Dept: PHYSICAL THERAPY | Age: 23
Setting detail: RECURRING SERIES
End: 2023-07-05
Payer: COMMERCIAL

## 2023-07-07 ENCOUNTER — HOSPITAL ENCOUNTER (OUTPATIENT)
Dept: PHYSICAL THERAPY | Age: 23
Setting detail: RECURRING SERIES
End: 2023-07-07
Payer: COMMERCIAL

## 2023-07-10 ENCOUNTER — HOSPITAL ENCOUNTER (OUTPATIENT)
Dept: PHYSICAL THERAPY | Age: 23
Setting detail: RECURRING SERIES
Discharge: HOME | End: 2023-07-13
Payer: COMMERCIAL

## 2023-07-10 PROCEDURE — 97110 THERAPEUTIC EXERCISES: CPT

## 2023-07-10 ASSESSMENT — PAIN SCALES - GENERAL: PAINLEVEL_OUTOF10: 0

## 2023-07-10 NOTE — PROGRESS NOTES
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 ([place] [employer])  Secondary: [address] @ [address] [address]  Phone: [phone]  Fax: [phone] Plan Frequency: 3x/week for 12 weeks  Plan of Care/Certification Expiration Date: 23      >PT Visit Info:  Plan Frequency: 3x/week for 12 weeks  Plan of Care/Certification Expiration Date: 23      Visit Count:  26    OUTPATIENT PHYSICAL THERAPY:OP NOTE TYPE: Treatment Note 7/10/2023       Episode  }Appt Desk             Treatment Diagnosis:  Pain in Right Hip (M25.551)  Stiffness of Right Hip, Not elsewhere classified (M25.651)  Medical/Referring Diagnosis:  Pain in right hip [M25.551]  Other specified joint disorders, unspecified hip [M25.859]  Referring Physician:  Antonio Castillo MD MD Orders:  PT Eval and Treat   Date of Onset:  Onset Date: 23 (s/p right hip labral repair)     Allergies:   Patient has no known allergies. Restrictions/Precautions:  Restrictions/Precautions: -- (per surgical precautions)  No data recorded   Interventions Planned (Treatment may consist of any combination of the following):    Current Treatment Recommendations: Strengthening; ROM; Balance training; Functional mobility training; Transfer training; ADL/Self-care training; Endurance training; Gait training; Stair training; Neuromuscular re-education; Manual; Pain management; Return to work related activity; Home exercise program; Patient/Caregiver education & training; Modalities; Therapeutic activities     >Subjective Comments:       No new problems. \" I was on vacation at home last week\". >Initial:     00/10>Post Session:       00/10  Medications Last Reviewed:  7/10/2023  Updated Objective Findings:      Goals: (Goals have been discussed and agreed upon with patient.)  Short-Term Functional Goals: Time Frame: 6 weeks  MET  Patient will be independent with HEP.    Patient will

## 2023-07-12 ENCOUNTER — HOSPITAL ENCOUNTER (OUTPATIENT)
Dept: PHYSICAL THERAPY | Age: 23
Setting detail: RECURRING SERIES
Discharge: HOME | End: 2023-07-15
Payer: COMMERCIAL

## 2023-07-12 PROCEDURE — 97110 THERAPEUTIC EXERCISES: CPT

## 2023-07-12 ASSESSMENT — PAIN SCALES - GENERAL: PAINLEVEL_OUTOF10: 0

## 2023-07-14 ENCOUNTER — HOSPITAL ENCOUNTER (OUTPATIENT)
Dept: PHYSICAL THERAPY | Age: 23
Setting detail: RECURRING SERIES
Discharge: HOME | End: 2023-07-17
Payer: COMMERCIAL

## 2023-07-14 PROCEDURE — 97110 THERAPEUTIC EXERCISES: CPT

## 2023-07-14 ASSESSMENT — PAIN SCALES - GENERAL: PAINLEVEL_OUTOF10: 0

## 2023-07-17 ENCOUNTER — HOSPITAL ENCOUNTER (OUTPATIENT)
Dept: PHYSICAL THERAPY | Age: 23
Setting detail: RECURRING SERIES
Discharge: HOME | End: 2023-07-20
Payer: COMMERCIAL

## 2023-07-17 PROCEDURE — 97110 THERAPEUTIC EXERCISES: CPT

## 2023-07-17 NOTE — PROGRESS NOTES
Jorge Payne  : 2000  Primary: [employer] ([place] [employer])  Secondary: [address] @ [address] [address]  Phone: [phone]  Fax: [phone] Plan Frequency: 3x/week for 12 weeks  Plan of Care/Certification Expiration Date: 23      >PT Visit Info:  Plan Frequency: 3x/week for 12 weeks  Plan of Care/Certification Expiration Date: 23      Visit Count:  29    OUTPATIENT PHYSICAL THERAPY:OP NOTE TYPE: Treatment Note 2023       Episode  }Appt Desk             Treatment Diagnosis:  Pain in Right Hip (M25.551)  Stiffness of Right Hip, Not elsewhere classified (M25.651)  Medical/Referring Diagnosis:  Pain in right hip [M25.551]  Other specified joint disorders, unspecified hip [M25.859]  Referring Physician:  Jose Car MD MD Orders:  PT Eval and Treat   Date of Onset:  Onset Date: 23 (s/p right hip labral repair)     Allergies:   Patient has no known allergies. Restrictions/Precautions:  Restrictions/Precautions: -- (per surgical precautions)  No data recorded   Interventions Planned (Treatment may consist of any combination of the following):    Current Treatment Recommendations: Strengthening; ROM; Balance training; Functional mobility training; Transfer training; ADL/Self-care training; Endurance training; Gait training; Stair training; Neuromuscular re-education; Manual; Pain management; Return to work related activity; Home exercise program; Patient/Caregiver education & training; Modalities; Therapeutic activities     >Subjective Comments:       Notes the hip has been feeling much better. >Initial:      0/10>Post Session:        0/10  Medications Last Reviewed:  2023  Updated Objective Findings:      Goals: (Goals have been discussed and agreed upon with patient.)  Short-Term Functional Goals: Time Frame: 6 weeks  MET  Patient will be independent with HEP.    Patient will demonstrate sufficient

## 2023-07-19 ENCOUNTER — HOSPITAL ENCOUNTER (OUTPATIENT)
Dept: PHYSICAL THERAPY | Age: 23
Setting detail: RECURRING SERIES
Discharge: HOME | End: 2023-07-22
Payer: COMMERCIAL

## 2023-07-19 PROCEDURE — 97110 THERAPEUTIC EXERCISES: CPT

## 2023-07-19 NOTE — PROGRESS NOTES
Geo Rawls  : 2000  Primary: [address] ([address] [facility])  Secondary: [address] @ [address] [address]  Phone: [phone]  Fax: [phone] Plan Frequency: 3x/week for 12 weeks  Plan of Care/Certification Expiration Date: 23      >PT Visit Info:  Plan Frequency: 3x/week for 12 weeks  Plan of Care/Certification Expiration Date: 23      Visit Count:  30    OUTPATIENT PHYSICAL THERAPY:OP NOTE TYPE: Treatment Note 2023       Episode  }Appt Desk             Treatment Diagnosis:  Pain in Right Hip (M25.551)  Stiffness of Right Hip, Not elsewhere classified (M25.651)  Medical/Referring Diagnosis:  Pain in right hip [M25.551]  Other specified joint disorders, unspecified hip [M25.859]  Referring Physician:  Edelmira Mclaughlin MD MD Orders:  PT Eval and Treat   Date of Onset:  Onset Date: 23 (s/p right hip labral repair)     Allergies:   Patient has no known allergies. Restrictions/Precautions:  Restrictions/Precautions: -- (per surgical precautions)  No data recorded   Interventions Planned (Treatment may consist of any combination of the following):    Current Treatment Recommendations: Strengthening; ROM; Balance training; Functional mobility training; Transfer training; ADL/Self-care training; Endurance training; Gait training; Stair training; Neuromuscular re-education; Manual; Pain management; Return to work related activity; Home exercise program; Patient/Caregiver education & training; Modalities; Therapeutic activities     >Subjective Comments:       Experienced a cramp through the anterior hip yesterday. Notes he's been feeling good today.    >Initial:      0/10>Post Session:        0/10  Medications Last Reviewed:  2023  Updated Objective Findings:      Goals: (Goals have been discussed and agreed upon with patient.)  Short-Term Functional Goals: Time Frame: 6 weeks  MET  Patient will be independent

## 2023-07-21 ENCOUNTER — APPOINTMENT (OUTPATIENT)
Dept: PHYSICAL THERAPY | Age: 23
End: 2023-07-21
Payer: COMMERCIAL

## 2023-07-26 ENCOUNTER — HOSPITAL ENCOUNTER (OUTPATIENT)
Dept: PHYSICAL THERAPY | Age: 23
Setting detail: RECURRING SERIES
Discharge: HOME | End: 2023-07-29
Payer: COMMERCIAL

## 2023-07-26 PROCEDURE — 97110 THERAPEUTIC EXERCISES: CPT

## 2023-07-26 NOTE — PROGRESS NOTES
Aniket Mayorga  : 2000  Primary: [address] ([address])  Secondary: [address] @ [address] [address]  Phone: [phone]  Fax: [phone] Plan Frequency: 3x/week for 12 weeks  Plan of Care/Certification Expiration Date: 23      >PT Visit Info:  Plan Frequency: 3x/week for 12 weeks  Plan of Care/Certification Expiration Date: 23      Visit Count:  31    OUTPATIENT PHYSICAL THERAPY:OP NOTE TYPE: Treatment Note 2023       Episode  }Appt Desk             Treatment Diagnosis:  Pain in Right Hip (M25.551)  Stiffness of Right Hip, Not elsewhere classified (M25.651)  Medical/Referring Diagnosis:  Pain in right hip [M25.551]  Other specified joint disorders, unspecified hip [M25.859]  Referring Physician:  Antonio Castillo MD MD Orders:  PT Eval and Treat   Date of Onset:  Onset Date: 23 (s/p right hip labral repair)     Allergies:   Patient has no known allergies. Restrictions/Precautions:  Restrictions/Precautions: -- (per surgical precautions)  No data recorded   Interventions Planned (Treatment may consist of any combination of the following):    Current Treatment Recommendations: Strengthening; ROM; Balance training; Functional mobility training; Transfer training; ADL/Self-care training; Endurance training; Gait training; Stair training; Neuromuscular re-education; Manual; Pain management; Return to work related activity; Home exercise program; Patient/Caregiver education & training; Modalities; Therapeutic activities     >Subjective Comments:       Pt has been out the past few sessions due to strep. He notes feeling better but slight decrease in energy. Kajal Fears    >Initial:      0/10>Post Session:        0/10  Medications Last Reviewed:  2023  Updated Objective Findings:      Goals: (Goals have been discussed and agreed upon with patient.)  Short-Term Functional Goals: Time Frame: 6 weeks  MET  Patient

## 2023-07-27 ENCOUNTER — HOSPITAL ENCOUNTER (OUTPATIENT)
Dept: PHYSICAL THERAPY | Age: 23
Setting detail: RECURRING SERIES
Discharge: HOME | End: 2023-07-30
Payer: COMMERCIAL

## 2023-07-27 PROCEDURE — 97110 THERAPEUTIC EXERCISES: CPT

## 2023-07-27 NOTE — PROGRESS NOTES
Zbigniew Sprain  : 2000  Primary: [address] ([address] [facility])  Secondary: [address] @ [address] [address]  Phone: [phone]  Fax: [phone] Plan Frequency: 3x/week for 12 weeks  Plan of Care/Certification Expiration Date: 23      >PT Visit Info:  Plan Frequency: 3x/week for 12 weeks  Plan of Care/Certification Expiration Date: 23      Visit Count:  32    OUTPATIENT PHYSICAL THERAPY:OP NOTE TYPE: Treatment Note 2023       Episode  }Appt Desk             Treatment Diagnosis:  Pain in Right Hip (M25.551)  Stiffness of Right Hip, Not elsewhere classified (M25.651)  Medical/Referring Diagnosis:  Pain in right hip [M25.551]  Other specified joint disorders, unspecified hip [M25.859]  Referring Physician:  Taylor Amado MD MD Orders:  PT Eval and Treat   Date of Onset:  Onset Date: 23 (s/p right hip labral repair)     Allergies:   Patient has no known allergies. Restrictions/Precautions:  Restrictions/Precautions: -- (per surgical precautions)  No data recorded   Interventions Planned (Treatment may consist of any combination of the following):    Current Treatment Recommendations: Strengthening; ROM; Balance training; Functional mobility training; Transfer training; ADL/Self-care training; Endurance training; Gait training; Stair training; Neuromuscular re-education; Manual; Pain management; Return to work related activity; Home exercise program; Patient/Caregiver education & training; Modalities; Therapeutic activities     >Subjective Comments:       No increase in soreness from previous session. >Initial:      0/10>Post Session:        0/10  Medications Last Reviewed:  2023  Updated Objective Findings:      Goals: (Goals have been discussed and agreed upon with patient.)  Short-Term Functional Goals: Time Frame: 6 weeks  MET  Patient will be independent with HEP.    Patient will demonstrate

## 2023-07-28 ENCOUNTER — APPOINTMENT (OUTPATIENT)
Dept: PHYSICAL THERAPY | Age: 23
End: 2023-07-28
Payer: COMMERCIAL

## 2023-07-31 ENCOUNTER — HOSPITAL ENCOUNTER (OUTPATIENT)
Dept: PHYSICAL THERAPY | Age: 23
Setting detail: RECURRING SERIES
Discharge: HOME | End: 2023-08-03
Payer: COMMERCIAL

## 2023-07-31 PROCEDURE — 97110 THERAPEUTIC EXERCISES: CPT

## 2023-08-02 ENCOUNTER — HOSPITAL ENCOUNTER (OUTPATIENT)
Dept: PHYSICAL THERAPY | Age: 23
Setting detail: RECURRING SERIES
Discharge: HOME | End: 2023-08-05
Payer: COMMERCIAL

## 2023-08-02 PROCEDURE — 97110 THERAPEUTIC EXERCISES: CPT

## 2023-08-03 NOTE — PROGRESS NOTES
8/2/23 PN    Modalities:            Ice to anterior hip x10 min  Ice to anterior hip x 10 min  Ice to anterior hip x 10 min  Ice to anterior hip x10 min  repeat repeat              Therapeutic Exercise: 40 min 45 min  45 min  45 min  45 min  45 min  45 min    bike 5 min warm up 5 min  5 min  5 min  5 min  5 min  5 min    Quad set Therapist assisted stretching into CLAUDIA; prone hip IR Assisted stretching into CLAUDIA, prone hip IR [id number] ea Assisted stretching into CLAUDIA, prone hip IR [id number] ea repeat repeat Assisted stretching into CLAUDIA, prone hip IR [id number] ea Assisted stretching into CLAUDIA, prone hip IR [id number] ea   Glute set Hip circuit: 2x20 clam, rev clam, hip abd ea  With 1# Hip curcuit 2x20 clamshell, reverse clamshell, hip abduction Hip circuit 2x20 clamshell, rev clamshell, hip abduction ea repeat repeat Active warm up: knee hug, lunge, lateral lunge x1 lap ea Active warm up: knee hug, lunge, lateral lunge x 1 lap ea   Hamstring set Walking knee hug, lunge, lateral lunge x 1 lap ea; lateral band walk black x2 laps  Walking knee hug, lunge x1 lap; band walk black x 1 lap Walking knee hug, lunge, lateral lunge x 1 lap; leg swings 20x lateral repeat repeat Pogo jumps 2x20 POGO jumps 2x20   Isometric trunk activation SL squat 25# from 18\" plyo 4x9 Pogo jumps 3x20 A skips, lateral shuffle, carioca x3 laps ea repeat Lateral shuffle, carioca x2 laps ea Squat jumps 3x8 Squat jumps 3x8; tuck jumps 3x8    [place] [facility] curls con/ecc 4x7 A skips x3 laps POGO jumps 2x20 POGO jumps 2x20 POGO jumps 2x20  Bounding in place 3x8 Bounding in place 3x8    Prone plank with hip ext 3x5 Lateral leg swings 2x20 Quick steps [id number] Squat jumps 3x6 Quick steps [id number] 180 jumps 3x8 180 jumps 3x8    Lateral plank with leg raise 3x5 ea side  Lateral shuffle x3 laps; carioca x 3 laps; quick steps [id number] SL squat 25# 4x10 SL squat 25# 4x10 Nordic hamstring curls 4x6 eccentrics [place] planks with leg raise 3x RPE 7/10 Figure 8 runs
hamstring curls 4x6 eccentrics [place] planks with leg raise 3x RPE 7/10           One leg bridge on chair B 3x10 SL squat from 18\" plyo 25# 3x10 Lateral plank with leg raise 3x RPE 8/10 repeat RFESS 25# 2x15 ea side  Prone plank with leg raise 3x RPE 7/10             RFESS 25# 2x15  Prone plank with leg raise 3xRPE 8/10               Side plank with leg raise 3x RPE 8/10                                        SAQ                                                Manual Therapy:                                                Functional Activities:                                                                                    Treatment/Session Summary:    >Treatment Assessment:   Continuing to progress with plyometrics with good symmetry on weight shift. Communication/Consultation:  None today  Equipment provided today:  None  Recommendations/Intent for next treatment session: Next visit will focus on progression of strength and return to prior level of function. >Total Treatment Billable Duration:  55 minutes  Time In: 1430  Time Out: 1515    Ash Ortiz, PT       Charge Capture  }Post Session Pain  PT Visit Info  [facility] Portal  MD Guidelines  Scanned Media  Benefits  MyChart    No future appointments.

## 2023-08-04 ENCOUNTER — HOSPITAL ENCOUNTER (OUTPATIENT)
Dept: PHYSICAL THERAPY | Age: 23
Setting detail: RECURRING SERIES
Discharge: HOME | End: 2023-08-07
Payer: COMMERCIAL

## 2023-08-04 PROCEDURE — 97110 THERAPEUTIC EXERCISES: CPT

## 2023-08-04 NOTE — PROGRESS NOTES
Jessica Ni  : 2000  Primary: [address] ([address])  Secondary: [address] @ [address] [facility] [address]  Phone: [phone]  Fax: [phone] Plan Frequency: 3x/week for 12 weeks  Plan of Care/Certification Expiration Date: 23      >PT Visit Info:  Plan Frequency: 3x/week for 12 weeks  Plan of Care/Certification Expiration Date: 23      Visit Count:  35    OUTPATIENT PHYSICAL THERAPY:OP NOTE TYPE: Treatment Note 2023       Episode  }Appt Desk             Treatment Diagnosis:  Pain in Right Hip (M25.551)  Stiffness of Right Hip, Not elsewhere classified (M25.651)  Medical/Referring Diagnosis:  Pain in right hip [M25.551]  Other specified joint disorders, unspecified hip [M25.859]  Referring Physician:  Silvia Pinto MD MD Orders:  PT Eval and Treat   Date of Onset:  Onset Date: 23 (s/p right hip labral repair)     Allergies:   Patient has no known allergies. Restrictions/Precautions:  Restrictions/Precautions: -- (per surgical precautions)  No data recorded   Interventions Planned (Treatment may consist of any combination of the following):    Current Treatment Recommendations: Strengthening; ROM; Balance training; Functional mobility training; Transfer training; ADL/Self-care training; Endurance training; Gait training; Stair training; Neuromuscular re-education; Manual; Pain management; Return to work related activity; Home exercise program; Patient/Caregiver education & training; Modalities; Therapeutic activities     >Subjective Comments:      Denies any tightness or discomfort to the inner thigh.   >Initial:      0/10>Post Session:        0/10  Medications Last Reviewed:  2023  Updated Objective Findings:      Goals: (Goals have been discussed and agreed upon with patient.)  Short-Term Functional Goals: Time Frame: 6 weeks  MET  Patient will be independent with HEP.    Patient will demonstrate

## 2023-08-15 ENCOUNTER — HOSPITAL ENCOUNTER (OUTPATIENT)
Dept: PHYSICAL THERAPY | Age: 23
Setting detail: RECURRING SERIES
Discharge: HOME | End: 2023-08-18
Payer: COMMERCIAL

## 2023-08-15 PROCEDURE — 97110 THERAPEUTIC EXERCISES: CPT

## 2023-08-15 NOTE — PROGRESS NOTES
Lilliam Domínguez  : 2000  Primary: [address] ([address] [facility])  Secondary: [address] @ [address] [address]  Phone: [phone]  Fax: [phone] Plan Frequency: 3x/week for 12 weeks  Plan of Care/Certification Expiration Date: 23      >PT Visit Info:  Plan Frequency: 3x/week for 12 weeks  Plan of Care/Certification Expiration Date: 23      Visit Count:  36    OUTPATIENT PHYSICAL THERAPY:OP NOTE TYPE: Treatment Note 8/15/2023       Episode  }Appt Desk             Treatment Diagnosis:  Pain in Right Hip (M25.551)  Stiffness of Right Hip, Not elsewhere classified (M25.651)  Medical/Referring Diagnosis:  Pain in right hip [M25.551]  Other specified joint disorders, unspecified hip [M25.859]  Referring Physician:  Natasha Larsen MD MD Orders:  PT Eval and Treat   Date of Onset:  Onset Date: 23 (s/p right hip labral repair)     Allergies:   Patient has no known allergies. Restrictions/Precautions:  Restrictions/Precautions: -- (per surgical precautions)  No data recorded   Interventions Planned (Treatment may consist of any combination of the following):    Current Treatment Recommendations: Strengthening; ROM; Balance training; Functional mobility training; Transfer training; ADL/Self-care training; Endurance training; Gait training; Stair training; Neuromuscular re-education; Manual; Pain management; Return to work related activity; Home exercise program; Patient/Caregiver education & training; Modalities; Therapeutic activities     >Subjective Comments:      Feels like the hip has been progressing well.  Still gets some sensation of tightness through the groin, but this resolves with a brief warm up.   >Initial:      0/10>Post Session:        0/10  Medications Last Reviewed:  8/15/2023  Updated Objective Findings:      Goals: (Goals have been discussed and agreed upon with patient.)  Short-Term Functional Goals:

## 2023-08-18 ENCOUNTER — HOSPITAL ENCOUNTER (OUTPATIENT)
Dept: PHYSICAL THERAPY | Age: 23
Setting detail: RECURRING SERIES
Discharge: HOME | End: 2023-08-21
Payer: COMMERCIAL

## 2023-08-18 PROCEDURE — 97110 THERAPEUTIC EXERCISES: CPT

## 2023-08-18 NOTE — PROGRESS NOTES
Reina Miller  : 2000  Primary: [address] ([address])  Secondary: [address] @ [address] [address]  Phone: [phone]  Fax: [phone] Plan Frequency: 3x/week for 12 weeks  Plan of Care/Certification Expiration Date: 23      >PT Visit Info:  Plan Frequency: 3x/week for 12 weeks  Plan of Care/Certification Expiration Date: 23      Visit Count:  40    OUTPATIENT PHYSICAL THERAPY:OP NOTE TYPE: Treatment Note/Discharge 2023       Episode  }Appt Desk             Treatment Diagnosis:  Pain in Right Hip (M25.551)  Stiffness of Right Hip, Not elsewhere classified (M25.651)  Medical/Referring Diagnosis:  Pain in right hip [M25.551]  Other specified joint disorders, unspecified hip [M25.859]  Referring Physician:  Praful Mccall MD MD Orders:  PT Eval and Treat   Date of Onset:  Onset Date: 23 (s/p right hip labral repair)     Allergies:   Patient has no known allergies. Restrictions/Precautions:  Restrictions/Precautions: -- (per surgical precautions)  No data recorded   Interventions Planned (Treatment may consist of any combination of the following):    Current Treatment Recommendations: Strengthening; ROM; Balance training; Functional mobility training; Transfer training; ADL/Self-care training; Endurance training; Gait training; Stair training; Neuromuscular re-education; Manual; Pain management; Return to work related activity; Home exercise program; Patient/Caregiver education & training; Modalities; Therapeutic activities     >Subjective Comments:      Notes the hip has been feeling good. No discomfort, feels comfortable with all activity. Sonny Ruiz    >Initial:      0/10>Post Session:        0/10  Medications Last Reviewed:  2023  Updated Objective Findings:      Goals: (Goals have been discussed and agreed upon with patient.)  Short-Term Functional Goals: Time Frame: 6 weeks  MET  Patient will be

## 2023-11-02 ENCOUNTER — OFFICE VISIT (OUTPATIENT)
Dept: ORTHOPEDIC SURGERY | Age: 23
End: 2023-11-02

## 2023-11-02 DIAGNOSIS — M25.551 RIGHT HIP PAIN: ICD-10-CM

## 2023-11-02 DIAGNOSIS — M76.891 TENDINITIS OF RIGHT QUADRICEPS TENDON: Primary | ICD-10-CM

## 2023-11-02 DIAGNOSIS — M25.561 RIGHT KNEE PAIN, UNSPECIFIED CHRONICITY: ICD-10-CM

## 2023-11-02 NOTE — PROGRESS NOTES
Name: Blue Chu  YOB: 2000  Gender: male  MRN: [id number]      CC: Knee Pain (R)       HPI: lBue Chu is a 23 y.o. male who presents with Knee Pain (R)  . He is a former  who is here today to examine his quad tendinitis. He states that this has been happening for the last 3 weeks primarily with lifting and jumping. He has been working with his  to try and resolve this issue. He states they have tried cupping dry needling and quad strengthening. He feels most of this pain with deep squats, jumping, and big agility movements. He does not recall a specific mechanism of injury. He has previous history of fracturing his patella but it has been so long ago he does not remember what side        Physical Examination:  General: no acute distress  Lungs: breathing easily  CV: regular rhythm by pulse  Right Knee: Focal tenderness to palpation of the distal third of the quad insertion in the musculotendinous region as well. Patellofemoral crepitus and manipulation of the patellofemoral joint. Good resisted strength without weakness ligamentously stable x4 no intra-articular effusion. Imaging:   Knee XR: 3 views     Clinical Indication  1. Tendinitis of right quadriceps tendon    2. Right hip pain    3. Right knee pain, unspecified chronicity           Report: AP, lateral, sunrise views of the Right knee demonstrates no acute fracture dislocation, or advanced degenerative changes    Impression: No acute findings as above           All imaging interpreted by myself Marky Kebede MD independent of radiology review        Assessment:     ICD-10-CM    1. Tendinitis of right quadriceps tendon  M76.891       2. Right hip pain  M25.551       3. Right knee pain, unspecified chronicity  M25.561 XR KNEE RIGHT (3 VIEWS)          Plan:   Acute quad strain and quad tendinitis. We discussed continued treatment with his .   He is on

## 2024-11-15 DIAGNOSIS — M25.552 ACUTE HIP PAIN, LEFT: Primary | ICD-10-CM

## 2024-11-15 RX ORDER — PREDNISONE 10 MG/1
1 TABLET ORAL SEE ADMIN INSTRUCTIONS
Qty: 1 EACH | Refills: 0 | Status: SHIPPED | OUTPATIENT
Start: 2024-11-15

## 2025-02-21 ENCOUNTER — OFFICE VISIT (OUTPATIENT)
Age: 25
End: 2025-02-21

## 2025-02-21 DIAGNOSIS — M79.641 RIGHT HAND PAIN: Primary | ICD-10-CM

## 2025-02-21 NOTE — PROGRESS NOTES
[facility] Note    Name: Joby Evangelista  YOB: 2000  Age: 24 y.o.  Gender: male  MRN: [id number]      CC: Patient referred for evaluation of upper extremity pain    HPI: Joby Evangelista is a 24 y.o. male Right hand dominant with a chief complaint of right hand pain, symptoms started 2 days ago playing basketball when he hyperextended his middle and ring fingers, he noticed instant swelling and ecchymosis.      ROS/Meds/PSH/PMH/FH/SH: I personally reviewed the patients standard intake form.  Pertinents are discussed in the HPI    Physical Examination:  General: Awake and alert.  HEENT: Normocephalic, atraumatic  CV/Pulm: Breathing even and unlabored  Skin: No obvious rashes noted.  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  Patient has good  although with some weakness and discomfort.  He has tenderness palpation mainly at the right ring finger MCP joint volarly, he has good finger extension.    Imaging / Electrodiagnostic Tests:     right Hand XR: AP, Lateral, Oblique and Thumb CMC joint     Clinical Indication:  1. Right hand pain           Report: AP, lateral, oblique and thumb CMC joint hand XRs demonstrates well-maintained joint space without evidence of fracture or dislocations    Impression: as above     Demetrio Hernandez MD     Ultrasound semination was performed, this demonstrates a partial tear of the volar plate of the ring finger and middle finger MCP joints    Assessment:   1. Right hand pain        Plan:   We discussed the diagnosis and different treatment options. We discussed observation, therapy, antiinflammatory medications and other pertinent treatment

## (undated) DEVICE — BOOT INSERT PAD

## (undated) DEVICE — SUTURE TAPE 2 40 INX1.3 MM X PAT BLK/WHT ORDER MULT OF 12 EA

## (undated) DEVICE — BLADE SURG 4MM DETACH STR W/ HNDL CAPSULECUT

## (undated) DEVICE — SOLUTION IRRIG 3000ML 0.9% SOD CHL USP UROMATIC PLAS CONT

## (undated) DEVICE — TUBING PMP L16FT MAIN DISP FOR AR-6400 AR-6475

## (undated) DEVICE — ABLATOR ASPIRATING RF H50 APOLLO

## (undated) DEVICE — PREP SKN CHLRAPRP APL 26ML STR --

## (undated) DEVICE — SUTURE MCRYL SZ 3-0 L27IN ABSRB UD L24MM PS-1 3/8 CIR PRIM Y936H

## (undated) DEVICE — SWIFTSTITCH HIP SUT PASS

## (undated) DEVICE — BUR CURVED 4.2MM EXCALIBUR

## (undated) DEVICE — FLOWPORT II CANNULA WITH OBTURATOR STRYKER 165MM: Brand: FLOWPORT

## (undated) DEVICE — C-ARM: Brand: UNBRANDED

## (undated) DEVICE — PORTAL ENTRY KIT

## (undated) DEVICE — DRAPE SHT 3 QTR PROXIMA 53X77 --

## (undated) DEVICE — 1010 S-DRAPE TOWEL DRAPE 10/BX: Brand: STERI-DRAPE™

## (undated) DEVICE — FLOWPORT II CANNULA WITH OBTURATOR, ARTHREX: Brand: FLOWPORT

## (undated) DEVICE — SINGLE PORT MANIFOLD: Brand: NEPTUNE 2

## (undated) DEVICE — GLOVE ORTHO 8   MSG9480

## (undated) DEVICE — SLINGSHOT 70 UP: Brand: SLINGSHOT

## (undated) DEVICE — TRANSPORT CANNULA 8MM LENGTH 7, 8, 9: Brand: TRANSPORT

## (undated) DEVICE — GLOVE ORANGE PI 7 1/2   MSG9075

## (undated) DEVICE — SOFT SILICONE HYDROCELLULAR FOAM DRESSING WITH LOCK AWAY LAYER: Brand: ALLEVYN LIFE XL 21X21 CTN10

## (undated) DEVICE — SUTURE FIBERWIRE SZ 2 W/ TAPERED NEEDLE BLUE L38IN NONABSORB BLU L26.5MM 1/2 CIRCLE AR7200

## (undated) DEVICE — SYRINGE, LUER LOCK, 60ML: Brand: MEDLINE

## (undated) DEVICE — PAD,ABDOMINAL,5"X9",ST,LF,25/BX: Brand: MEDLINE INDUSTRIES, INC.

## (undated) DEVICE — PERINEAL POST PAD 1

## (undated) DEVICE — SPONGE GZ W4XL4IN COT 12 PLY TYP VII WVN C FLD DSGN

## (undated) DEVICE — GLOVE SURG SZ 85 L12IN FNGR THK79MIL GRN LTX FREE

## (undated) DEVICE — HIP ARTHROSCOPY DR KOCH: Brand: MEDLINE INDUSTRIES, INC.

## (undated) DEVICE — SUTURE 2 40 INX1.3 MM X PAT BLUE/DK BLUE SUTURETAPE AR7506

## (undated) DEVICE — 3M™ IOBAN™ 2 ANTIMICROBIAL INCISE DRAPE 6650EZ: Brand: IOBAN™ 2

## (undated) DEVICE — TUBING, SUCTION, 1/4" X 10', STRAIGHT: Brand: MEDLINE

## (undated) DEVICE — BUR ROUND RETRACTABLE 5.0MM